# Patient Record
Sex: MALE | Race: ASIAN | NOT HISPANIC OR LATINO | URBAN - METROPOLITAN AREA
[De-identification: names, ages, dates, MRNs, and addresses within clinical notes are randomized per-mention and may not be internally consistent; named-entity substitution may affect disease eponyms.]

---

## 2017-11-22 ENCOUNTER — COMMUNICATION - HEALTHEAST (OUTPATIENT)
Dept: FAMILY MEDICINE | Facility: CLINIC | Age: 37
End: 2017-11-22

## 2017-12-04 ENCOUNTER — OFFICE VISIT - HEALTHEAST (OUTPATIENT)
Dept: INTERNAL MEDICINE | Facility: CLINIC | Age: 37
End: 2017-12-04

## 2017-12-04 ENCOUNTER — COMMUNICATION - HEALTHEAST (OUTPATIENT)
Dept: TELEHEALTH | Facility: CLINIC | Age: 37
End: 2017-12-04

## 2017-12-04 DIAGNOSIS — R21 SKIN RASH: ICD-10-CM

## 2017-12-04 DIAGNOSIS — N42.81 PROSTATE PAIN: ICD-10-CM

## 2017-12-04 DIAGNOSIS — Z00.00 ANNUAL PHYSICAL EXAM: ICD-10-CM

## 2017-12-04 DIAGNOSIS — E78.5 HYPERLIPIDEMIA: ICD-10-CM

## 2017-12-04 DIAGNOSIS — R79.89 ELEVATED LFTS: ICD-10-CM

## 2017-12-04 LAB
CHOLEST SERPL-MCNC: 249 MG/DL
FASTING STATUS PATIENT QL REPORTED: ABNORMAL
HDLC SERPL-MCNC: 59 MG/DL
LDLC SERPL CALC-MCNC: 179 MG/DL
TRIGL SERPL-MCNC: 55 MG/DL

## 2017-12-04 ASSESSMENT — MIFFLIN-ST. JEOR: SCORE: 1576.73

## 2017-12-05 ENCOUNTER — COMMUNICATION - HEALTHEAST (OUTPATIENT)
Dept: INTERNAL MEDICINE | Facility: CLINIC | Age: 37
End: 2017-12-05

## 2017-12-06 ENCOUNTER — RECORDS - HEALTHEAST (OUTPATIENT)
Dept: ADMINISTRATIVE | Facility: OTHER | Age: 37
End: 2017-12-06

## 2018-12-19 ENCOUNTER — OFFICE VISIT - HEALTHEAST (OUTPATIENT)
Dept: INTERNAL MEDICINE | Facility: CLINIC | Age: 38
End: 2018-12-19

## 2018-12-19 DIAGNOSIS — Z00.00 ANNUAL PHYSICAL EXAM: ICD-10-CM

## 2018-12-19 DIAGNOSIS — E78.5 HYPERLIPIDEMIA LDL GOAL <130: ICD-10-CM

## 2018-12-19 DIAGNOSIS — N50.89 TESTICULAR LUMP: ICD-10-CM

## 2018-12-19 LAB
ALBUMIN UR-MCNC: NEGATIVE MG/DL
APPEARANCE UR: CLEAR
BILIRUB UR QL STRIP: NEGATIVE
COLOR UR AUTO: YELLOW
ERYTHROCYTE [DISTWIDTH] IN BLOOD BY AUTOMATED COUNT: 11.2 % (ref 11–14.5)
GLUCOSE UR STRIP-MCNC: NEGATIVE MG/DL
HCT VFR BLD AUTO: 46.6 % (ref 40–54)
HGB BLD-MCNC: 15.8 G/DL (ref 14–18)
HGB UR QL STRIP: NEGATIVE
KETONES UR STRIP-MCNC: NEGATIVE MG/DL
LEUKOCYTE ESTERASE UR QL STRIP: NEGATIVE
MCH RBC QN AUTO: 29.7 PG (ref 27–34)
MCHC RBC AUTO-ENTMCNC: 34 G/DL (ref 32–36)
MCV RBC AUTO: 87 FL (ref 80–100)
NITRATE UR QL: NEGATIVE
PH UR STRIP: 6 [PH] (ref 5–8)
PLATELET # BLD AUTO: 170 THOU/UL (ref 140–440)
PMV BLD AUTO: 8.5 FL (ref 7–10)
RBC # BLD AUTO: 5.33 MILL/UL (ref 4.4–6.2)
SP GR UR STRIP: 1.02 (ref 1–1.03)
UROBILINOGEN UR STRIP-ACNC: NORMAL
WBC: 5.1 THOU/UL (ref 4–11)

## 2018-12-19 ASSESSMENT — MIFFLIN-ST. JEOR: SCORE: 1556.94

## 2018-12-20 ENCOUNTER — COMMUNICATION - HEALTHEAST (OUTPATIENT)
Dept: INTERNAL MEDICINE | Facility: CLINIC | Age: 38
End: 2018-12-20

## 2018-12-20 LAB
ALBUMIN SERPL-MCNC: 4.5 G/DL (ref 3.5–5)
ALP SERPL-CCNC: 62 U/L (ref 45–120)
ALT SERPL W P-5'-P-CCNC: 50 U/L (ref 0–45)
ANION GAP SERPL CALCULATED.3IONS-SCNC: 11 MMOL/L (ref 5–18)
AST SERPL W P-5'-P-CCNC: 26 U/L (ref 0–40)
BILIRUB SERPL-MCNC: 1.4 MG/DL (ref 0–1)
BUN SERPL-MCNC: 15 MG/DL (ref 8–22)
CALCIUM SERPL-MCNC: 9.8 MG/DL (ref 8.5–10.5)
CHLORIDE BLD-SCNC: 106 MMOL/L (ref 98–107)
CHOLEST SERPL-MCNC: 214 MG/DL
CO2 SERPL-SCNC: 25 MMOL/L (ref 22–31)
CREAT SERPL-MCNC: 0.82 MG/DL (ref 0.7–1.3)
FASTING STATUS PATIENT QL REPORTED: YES
GFR SERPL CREATININE-BSD FRML MDRD: >60 ML/MIN/1.73M2
GLUCOSE BLD-MCNC: 92 MG/DL (ref 70–125)
HDLC SERPL-MCNC: 58 MG/DL
LDLC SERPL CALC-MCNC: 142 MG/DL
POTASSIUM BLD-SCNC: 4 MMOL/L (ref 3.5–5)
PROT SERPL-MCNC: 7.8 G/DL (ref 6–8)
SODIUM SERPL-SCNC: 142 MMOL/L (ref 136–145)
TRIGL SERPL-MCNC: 70 MG/DL

## 2018-12-21 ENCOUNTER — COMMUNICATION - HEALTHEAST (OUTPATIENT)
Dept: INTERNAL MEDICINE | Facility: CLINIC | Age: 38
End: 2018-12-21

## 2019-05-09 ENCOUNTER — COMMUNICATION - HEALTHEAST (OUTPATIENT)
Dept: SCHEDULING | Facility: CLINIC | Age: 39
End: 2019-05-09

## 2019-05-10 ENCOUNTER — HOSPITAL ENCOUNTER (OUTPATIENT)
Dept: ULTRASOUND IMAGING | Facility: HOSPITAL | Age: 39
Discharge: HOME | End: 2019-05-10

## 2019-05-10 ENCOUNTER — OFFICE VISIT - HEALTHEAST (OUTPATIENT)
Dept: INTERNAL MEDICINE | Facility: CLINIC | Age: 39
End: 2019-05-10

## 2019-05-10 DIAGNOSIS — R10.11 RIGHT UPPER QUADRANT PAIN: ICD-10-CM

## 2019-05-10 LAB
ALBUMIN SERPL-MCNC: 4.5 G/DL (ref 3.5–5)
ALBUMIN UR-MCNC: NEGATIVE MG/DL
ALP SERPL-CCNC: 58 U/L (ref 45–120)
ALT SERPL W P-5'-P-CCNC: 45 U/L (ref 0–45)
ANION GAP SERPL CALCULATED.3IONS-SCNC: 8 MMOL/L (ref 5–18)
APPEARANCE UR: CLEAR
AST SERPL W P-5'-P-CCNC: 27 U/L (ref 0–40)
BASOPHILS # BLD AUTO: 0 THOU/UL (ref 0–0.2)
BASOPHILS NFR BLD AUTO: 1 % (ref 0–2)
BILIRUB SERPL-MCNC: 1.4 MG/DL (ref 0–1)
BILIRUB UR QL STRIP: NEGATIVE
BUN SERPL-MCNC: 15 MG/DL (ref 8–22)
CALCIUM SERPL-MCNC: 9.9 MG/DL (ref 8.5–10.5)
CHLORIDE BLD-SCNC: 107 MMOL/L (ref 98–107)
CO2 SERPL-SCNC: 28 MMOL/L (ref 22–31)
COLOR UR AUTO: YELLOW
CREAT SERPL-MCNC: 0.96 MG/DL (ref 0.7–1.3)
EOSINOPHIL # BLD AUTO: 0.1 THOU/UL (ref 0–0.4)
EOSINOPHIL NFR BLD AUTO: 1 % (ref 0–6)
ERYTHROCYTE [DISTWIDTH] IN BLOOD BY AUTOMATED COUNT: 11.4 % (ref 11–14.5)
GFR SERPL CREATININE-BSD FRML MDRD: >60 ML/MIN/1.73M2
GLUCOSE BLD-MCNC: 91 MG/DL (ref 70–125)
GLUCOSE UR STRIP-MCNC: NEGATIVE MG/DL
HCT VFR BLD AUTO: 48.8 % (ref 40–54)
HGB BLD-MCNC: 16.2 G/DL (ref 14–18)
HGB UR QL STRIP: NEGATIVE
KETONES UR STRIP-MCNC: NEGATIVE MG/DL
LEUKOCYTE ESTERASE UR QL STRIP: NEGATIVE
LIPASE SERPL-CCNC: 41 U/L (ref 0–52)
LYMPHOCYTES # BLD AUTO: 1.6 THOU/UL (ref 0.8–4.4)
LYMPHOCYTES NFR BLD AUTO: 33 % (ref 20–40)
MCH RBC QN AUTO: 29.8 PG (ref 27–34)
MCHC RBC AUTO-ENTMCNC: 33.3 G/DL (ref 32–36)
MCV RBC AUTO: 90 FL (ref 80–100)
MONOCYTES # BLD AUTO: 0.4 THOU/UL (ref 0–0.9)
MONOCYTES NFR BLD AUTO: 8 % (ref 2–10)
NEUTROPHILS # BLD AUTO: 2.8 THOU/UL (ref 2–7.7)
NEUTROPHILS NFR BLD AUTO: 58 % (ref 50–70)
NITRATE UR QL: NEGATIVE
PH UR STRIP: 6 [PH] (ref 5–8)
PLATELET # BLD AUTO: 176 THOU/UL (ref 140–440)
PMV BLD AUTO: 7.9 FL (ref 7–10)
POTASSIUM BLD-SCNC: 4.1 MMOL/L (ref 3.5–5)
PROT SERPL-MCNC: 7.6 G/DL (ref 6–8)
RBC # BLD AUTO: 5.44 MILL/UL (ref 4.4–6.2)
SODIUM SERPL-SCNC: 143 MMOL/L (ref 136–145)
SP GR UR STRIP: 1.02 (ref 1–1.03)
UROBILINOGEN UR STRIP-ACNC: NORMAL
WBC: 4.9 THOU/UL (ref 4–11)

## 2019-05-10 ASSESSMENT — MIFFLIN-ST. JEOR: SCORE: 1571.63

## 2019-05-13 ENCOUNTER — COMMUNICATION - HEALTHEAST (OUTPATIENT)
Dept: INTERNAL MEDICINE | Facility: CLINIC | Age: 39
End: 2019-05-13

## 2019-05-13 DIAGNOSIS — E80.6 HYPERBILIRUBINEMIA: ICD-10-CM

## 2019-07-02 ENCOUNTER — COMMUNICATION - HEALTHEAST (OUTPATIENT)
Dept: INTERNAL MEDICINE | Facility: CLINIC | Age: 39
End: 2019-07-02

## 2019-07-02 ENCOUNTER — RECORDS - HEALTHEAST (OUTPATIENT)
Dept: GENERAL RADIOLOGY | Facility: CLINIC | Age: 39
End: 2019-07-02

## 2019-07-02 DIAGNOSIS — R10.13 ABDOMINAL PAIN, EPIGASTRIC: ICD-10-CM

## 2019-07-02 DIAGNOSIS — R10.11 RIGHT UPPER QUADRANT PAIN: ICD-10-CM

## 2019-07-05 ENCOUNTER — AMBULATORY - HEALTHEAST (OUTPATIENT)
Dept: LAB | Facility: CLINIC | Age: 39
End: 2019-07-05

## 2019-07-05 DIAGNOSIS — R10.13 ABDOMINAL PAIN, EPIGASTRIC: ICD-10-CM

## 2019-07-08 ENCOUNTER — COMMUNICATION - HEALTHEAST (OUTPATIENT)
Dept: INTERNAL MEDICINE | Facility: CLINIC | Age: 39
End: 2019-07-08

## 2019-07-08 LAB
H PYLORI AG STL QL IA: NORMAL
REPORT STATUS: NORMAL
SPECIMEN DESCRIPTION: NORMAL

## 2019-07-09 ENCOUNTER — COMMUNICATION - HEALTHEAST (OUTPATIENT)
Dept: INTERNAL MEDICINE | Facility: CLINIC | Age: 39
End: 2019-07-09

## 2020-02-24 ENCOUNTER — COMMUNICATION - HEALTHEAST (OUTPATIENT)
Dept: SCHEDULING | Facility: CLINIC | Age: 40
End: 2020-02-24

## 2020-04-13 ENCOUNTER — COMMUNICATION - HEALTHEAST (OUTPATIENT)
Dept: SCHEDULING | Facility: CLINIC | Age: 40
End: 2020-04-13

## 2020-04-14 ENCOUNTER — COMMUNICATION - HEALTHEAST (OUTPATIENT)
Dept: CARDIOLOGY | Facility: CLINIC | Age: 40
End: 2020-04-14

## 2020-04-15 ENCOUNTER — HOSPITAL ENCOUNTER (OUTPATIENT)
Dept: RADIOLOGY | Facility: HOSPITAL | Age: 40
Discharge: HOME | End: 2020-04-15
Attending: INTERNAL MEDICINE

## 2020-04-15 ENCOUNTER — OFFICE VISIT - HEALTHEAST (OUTPATIENT)
Dept: CARDIOLOGY | Facility: CLINIC | Age: 40
End: 2020-04-15

## 2020-04-15 DIAGNOSIS — R07.89 CHEST DISCOMFORT: ICD-10-CM

## 2020-04-15 DIAGNOSIS — E78.5 HYPERLIPIDEMIA LDL GOAL <100: ICD-10-CM

## 2020-04-15 DIAGNOSIS — R07.89 ATYPICAL CHEST PAIN: ICD-10-CM

## 2020-04-15 DIAGNOSIS — R06.00 DYSPNEA: ICD-10-CM

## 2020-04-15 LAB
ALBUMIN SERPL-MCNC: 4.3 G/DL (ref 3.5–5)
ALP SERPL-CCNC: 60 U/L (ref 45–120)
ALT SERPL W P-5'-P-CCNC: 44 U/L (ref 0–45)
ANION GAP SERPL CALCULATED.3IONS-SCNC: 8 MMOL/L (ref 5–18)
AST SERPL W P-5'-P-CCNC: 25 U/L (ref 0–40)
BILIRUB SERPL-MCNC: 1.7 MG/DL (ref 0–1)
BUN SERPL-MCNC: 13 MG/DL (ref 8–22)
CALCIUM SERPL-MCNC: 9.4 MG/DL (ref 8.5–10.5)
CHLORIDE BLD-SCNC: 104 MMOL/L (ref 98–107)
CHOLEST SERPL-MCNC: 225 MG/DL
CO2 SERPL-SCNC: 29 MMOL/L (ref 22–31)
CREAT SERPL-MCNC: 1.09 MG/DL (ref 0.7–1.3)
D DIMER PPP FEU-MCNC: <0.27 FEU UG/ML
ERYTHROCYTE [DISTWIDTH] IN BLOOD BY AUTOMATED COUNT: 12.9 % (ref 11–14.5)
FASTING STATUS PATIENT QL REPORTED: YES
GFR SERPL CREATININE-BSD FRML MDRD: >60 ML/MIN/1.73M2
GLUCOSE BLD-MCNC: 101 MG/DL (ref 70–125)
HCT VFR BLD AUTO: 47.9 % (ref 40–54)
HDLC SERPL-MCNC: 53 MG/DL
HGB BLD-MCNC: 16 G/DL (ref 14–18)
LDLC SERPL CALC-MCNC: 160 MG/DL
MCH RBC QN AUTO: 29.3 PG (ref 27–34)
MCHC RBC AUTO-ENTMCNC: 33.4 G/DL (ref 32–36)
MCV RBC AUTO: 88 FL (ref 80–100)
PLATELET # BLD AUTO: 213 THOU/UL (ref 140–440)
PMV BLD AUTO: 11 FL (ref 8.5–12.5)
POTASSIUM BLD-SCNC: 4 MMOL/L (ref 3.5–5)
PROT SERPL-MCNC: 7.6 G/DL (ref 6–8)
RBC # BLD AUTO: 5.47 MILL/UL (ref 4.4–6.2)
SODIUM SERPL-SCNC: 141 MMOL/L (ref 136–145)
TRIGL SERPL-MCNC: 62 MG/DL
TROPONIN I SERPL-MCNC: <0.01 NG/ML (ref 0–0.29)
WBC: 4.5 THOU/UL (ref 4–11)

## 2020-04-15 ASSESSMENT — MIFFLIN-ST. JEOR: SCORE: 1562.84

## 2020-04-16 LAB
ATRIAL RATE - MUSE: 61 BPM
DIASTOLIC BLOOD PRESSURE - MUSE: NORMAL
INTERPRETATION ECG - MUSE: NORMAL
P AXIS - MUSE: 55 DEGREES
PR INTERVAL - MUSE: 166 MS
QRS DURATION - MUSE: 94 MS
QT - MUSE: 420 MS
QTC - MUSE: 422 MS
R AXIS - MUSE: 85 DEGREES
SYSTOLIC BLOOD PRESSURE - MUSE: NORMAL
T AXIS - MUSE: 33 DEGREES
VENTRICULAR RATE- MUSE: 61 BPM

## 2020-04-20 ENCOUNTER — COMMUNICATION - HEALTHEAST (OUTPATIENT)
Dept: INTERNAL MEDICINE | Facility: CLINIC | Age: 40
End: 2020-04-20

## 2020-04-21 ENCOUNTER — COMMUNICATION - HEALTHEAST (OUTPATIENT)
Dept: CARDIOLOGY | Facility: CLINIC | Age: 40
End: 2020-04-21

## 2020-04-22 ENCOUNTER — OFFICE VISIT - HEALTHEAST (OUTPATIENT)
Dept: INTERNAL MEDICINE | Facility: CLINIC | Age: 40
End: 2020-04-22

## 2020-04-22 DIAGNOSIS — R07.89 SENSATION OF CHEST TIGHTNESS: ICD-10-CM

## 2020-04-22 DIAGNOSIS — R20.2 PARESTHESIA: ICD-10-CM

## 2020-04-22 DIAGNOSIS — R10.12 LUQ ABDOMINAL PAIN: ICD-10-CM

## 2020-04-23 ENCOUNTER — COMMUNICATION - HEALTHEAST (OUTPATIENT)
Dept: INTERNAL MEDICINE | Facility: CLINIC | Age: 40
End: 2020-04-23

## 2020-04-23 ENCOUNTER — COMMUNICATION - HEALTHEAST (OUTPATIENT)
Dept: SCHEDULING | Facility: CLINIC | Age: 40
End: 2020-04-23

## 2020-04-23 ENCOUNTER — AMBULATORY - HEALTHEAST (OUTPATIENT)
Dept: LAB | Facility: CLINIC | Age: 40
End: 2020-04-23

## 2020-04-23 DIAGNOSIS — R10.12 LUQ ABDOMINAL PAIN: ICD-10-CM

## 2020-04-23 DIAGNOSIS — R20.2 PARESTHESIA: ICD-10-CM

## 2020-04-23 LAB
ERYTHROCYTE [SEDIMENTATION RATE] IN BLOOD BY WESTERGREN METHOD: 6 MM/HR (ref 0–15)
LIPASE SERPL-CCNC: 51 U/L (ref 0–52)
VIT B12 SERPL-MCNC: 656 PG/ML (ref 213–816)

## 2020-04-24 ENCOUNTER — HOSPITAL ENCOUNTER (OUTPATIENT)
Dept: CARDIOLOGY | Facility: HOSPITAL | Age: 40
Discharge: HOME | End: 2020-04-24
Attending: INTERNAL MEDICINE

## 2020-04-24 ENCOUNTER — COMMUNICATION - HEALTHEAST (OUTPATIENT)
Dept: INTERNAL MEDICINE | Facility: CLINIC | Age: 40
End: 2020-04-24

## 2020-04-24 DIAGNOSIS — R07.89 ATYPICAL CHEST PAIN: ICD-10-CM

## 2020-04-24 LAB
CV STRESS CURRENT BP HE: NORMAL
CV STRESS CURRENT HR HE: 102
CV STRESS CURRENT HR HE: 108
CV STRESS CURRENT HR HE: 109
CV STRESS CURRENT HR HE: 115
CV STRESS CURRENT HR HE: 125
CV STRESS CURRENT HR HE: 126
CV STRESS CURRENT HR HE: 141
CV STRESS CURRENT HR HE: 149
CV STRESS CURRENT HR HE: 152
CV STRESS CURRENT HR HE: 152
CV STRESS CURRENT HR HE: 157
CV STRESS CURRENT HR HE: 158
CV STRESS CURRENT HR HE: 160
CV STRESS CURRENT HR HE: 167
CV STRESS CURRENT HR HE: 177
CV STRESS CURRENT HR HE: 65
CV STRESS CURRENT HR HE: 76
CV STRESS CURRENT HR HE: 86
CV STRESS CURRENT HR HE: 87
CV STRESS CURRENT HR HE: 89
CV STRESS CURRENT HR HE: 90
CV STRESS CURRENT HR HE: 93
CV STRESS CURRENT HR HE: 93
CV STRESS CURRENT HR HE: 95
CV STRESS CURRENT HR HE: 96
CV STRESS CURRENT HR HE: 98
CV STRESS DEVIATION TIME HE: NORMAL
CV STRESS ECHO PERCENT HR HE: NORMAL
CV STRESS EXERCISE STAGE HE: NORMAL
CV STRESS FINAL RESTING BP HE: NORMAL
CV STRESS FINAL RESTING HR HE: 89
CV STRESS MAX HR HE: 177
CV STRESS MAX TREADMILL GRADE HE: 16
CV STRESS MAX TREADMILL SPEED HE: 4.2
CV STRESS PEAK DIA BP HE: NORMAL
CV STRESS PEAK SYS BP HE: NORMAL
CV STRESS PHASE HE: NORMAL
CV STRESS PROTOCOL HE: NORMAL
CV STRESS RESTING PT POSITION HE: NORMAL
CV STRESS RESTING PT POSITION HE: NORMAL
CV STRESS ST DEVIATION AMOUNT HE: NORMAL
CV STRESS ST DEVIATION ELEVATION HE: NORMAL
CV STRESS ST EVELATION AMOUNT HE: NORMAL
CV STRESS TEST TYPE HE: NORMAL
CV STRESS TOTAL STAGE TIME MIN 1 HE: NORMAL
ECHO EJECTION FRACTION ESTIMATED: 60 %
RATE PRESSURE PRODUCT: NORMAL
STRESS ECHO BASELINE DIASTOLIC HE: 83
STRESS ECHO BASELINE HR: 64
STRESS ECHO BASELINE SYSTOLIC BP: 119
STRESS ECHO CALCULATED PERCENT HR: 99 %
STRESS ECHO LAST STRESS DIASTOLIC BP: 74
STRESS ECHO LAST STRESS HR: 177
STRESS ECHO LAST STRESS SYSTOLIC BP: 152
STRESS ECHO POST ESTIMATED WORKLOAD: 10.9
STRESS ECHO POST EXERCISE DUR MIN: 9
STRESS ECHO POST EXERCISE DUR SEC: 29
STRESS ECHO TARGET HR: 179

## 2020-04-28 ENCOUNTER — COMMUNICATION - HEALTHEAST (OUTPATIENT)
Dept: INTERNAL MEDICINE | Facility: CLINIC | Age: 40
End: 2020-04-28

## 2020-04-28 ENCOUNTER — COMMUNICATION - HEALTHEAST (OUTPATIENT)
Dept: ADMINISTRATIVE | Facility: CLINIC | Age: 40
End: 2020-04-28

## 2020-04-29 ENCOUNTER — COMMUNICATION - HEALTHEAST (OUTPATIENT)
Dept: INTERNAL MEDICINE | Facility: CLINIC | Age: 40
End: 2020-04-29

## 2020-05-27 ENCOUNTER — COMMUNICATION - HEALTHEAST (OUTPATIENT)
Dept: INTERNAL MEDICINE | Facility: CLINIC | Age: 40
End: 2020-05-27

## 2020-05-27 DIAGNOSIS — K21.00 GASTROESOPHAGEAL REFLUX DISEASE WITH ESOPHAGITIS: ICD-10-CM

## 2020-07-16 ENCOUNTER — RECORDS - HEALTHEAST (OUTPATIENT)
Dept: ADMINISTRATIVE | Facility: OTHER | Age: 40
End: 2020-07-16

## 2020-10-07 ENCOUNTER — OFFICE VISIT - HEALTHEAST (OUTPATIENT)
Dept: INTERNAL MEDICINE | Facility: CLINIC | Age: 40
End: 2020-10-07

## 2020-10-07 DIAGNOSIS — E78.5 HYPERLIPIDEMIA LDL GOAL <130: ICD-10-CM

## 2020-10-07 DIAGNOSIS — R17 ELEVATED BILIRUBIN: ICD-10-CM

## 2020-10-07 DIAGNOSIS — R07.9 RIGHT-SIDED CHEST PAIN: ICD-10-CM

## 2020-10-07 DIAGNOSIS — F41.9 ANXIETY: ICD-10-CM

## 2020-10-07 LAB
ALBUMIN SERPL-MCNC: 4.8 G/DL (ref 3.5–5)
ALP SERPL-CCNC: 65 U/L (ref 45–120)
ALT SERPL W P-5'-P-CCNC: 36 U/L (ref 0–45)
ANION GAP SERPL CALCULATED.3IONS-SCNC: 15 MMOL/L (ref 5–18)
AST SERPL W P-5'-P-CCNC: 24 U/L (ref 0–40)
BASOPHILS # BLD AUTO: 0 THOU/UL (ref 0–0.2)
BASOPHILS NFR BLD AUTO: 1 % (ref 0–2)
BILIRUB SERPL-MCNC: 2.2 MG/DL (ref 0–1)
BUN SERPL-MCNC: 19 MG/DL (ref 8–22)
CALCIUM SERPL-MCNC: 9.6 MG/DL (ref 8.5–10.5)
CHLORIDE BLD-SCNC: 104 MMOL/L (ref 98–107)
CHOLEST SERPL-MCNC: 207 MG/DL
CO2 SERPL-SCNC: 22 MMOL/L (ref 22–31)
CREAT SERPL-MCNC: 1.01 MG/DL (ref 0.7–1.3)
D DIMER PPP FEU-MCNC: <=0.27 FEU UG/ML
EOSINOPHIL # BLD AUTO: 0.1 THOU/UL (ref 0–0.4)
EOSINOPHIL NFR BLD AUTO: 2 % (ref 0–6)
ERYTHROCYTE [DISTWIDTH] IN BLOOD BY AUTOMATED COUNT: 12 % (ref 11–14.5)
ERYTHROCYTE [SEDIMENTATION RATE] IN BLOOD BY WESTERGREN METHOD: 4 MM/HR (ref 0–15)
FASTING STATUS PATIENT QL REPORTED: YES
GFR SERPL CREATININE-BSD FRML MDRD: >60 ML/MIN/1.73M2
GLUCOSE BLD-MCNC: 90 MG/DL (ref 70–125)
HCT VFR BLD AUTO: 48.3 % (ref 40–54)
HDLC SERPL-MCNC: 57 MG/DL
HGB BLD-MCNC: 15.9 G/DL (ref 14–18)
LDLC SERPL CALC-MCNC: 138 MG/DL
LYMPHOCYTES # BLD AUTO: 1.3 THOU/UL (ref 0.8–4.4)
LYMPHOCYTES NFR BLD AUTO: 28 % (ref 20–40)
MCH RBC QN AUTO: 29.6 PG (ref 27–34)
MCHC RBC AUTO-ENTMCNC: 32.9 G/DL (ref 32–36)
MCV RBC AUTO: 90 FL (ref 80–100)
MONOCYTES # BLD AUTO: 0.2 THOU/UL (ref 0–0.9)
MONOCYTES NFR BLD AUTO: 5 % (ref 2–10)
NEUTROPHILS # BLD AUTO: 3 THOU/UL (ref 2–7.7)
NEUTROPHILS NFR BLD AUTO: 65 % (ref 50–70)
PLATELET # BLD AUTO: 184 THOU/UL (ref 140–440)
PMV BLD AUTO: 8.5 FL (ref 7–10)
POTASSIUM BLD-SCNC: 4.2 MMOL/L (ref 3.5–5)
PROT SERPL-MCNC: 7.8 G/DL (ref 6–8)
RBC # BLD AUTO: 5.37 MILL/UL (ref 4.4–6.2)
SODIUM SERPL-SCNC: 141 MMOL/L (ref 136–145)
TRIGL SERPL-MCNC: 59 MG/DL
WBC: 4.6 THOU/UL (ref 4–11)

## 2020-10-09 ENCOUNTER — COMMUNICATION - HEALTHEAST (OUTPATIENT)
Dept: INTERNAL MEDICINE | Facility: CLINIC | Age: 40
End: 2020-10-09

## 2020-10-09 LAB
BILIRUB DIRECT SERPL-MCNC: 0.5 MG/DL
BILIRUB INDIRECT SERPL-MCNC: 1.7 MG/DL (ref 0–1)
BILIRUB SERPL-MCNC: 2.2 MG/DL (ref 0–1)
GLIADIN IGA SER-ACNC: 1 U/ML
GLIADIN IGG SER-ACNC: 0.4 U/ML
IGA SERPL-MCNC: 165 MG/DL (ref 65–400)
TTG IGA SER-ACNC: 0.6 U/ML
TTG IGG SER-ACNC: 0.7 U/ML

## 2020-10-11 ENCOUNTER — COMMUNICATION - HEALTHEAST (OUTPATIENT)
Dept: INTERNAL MEDICINE | Facility: CLINIC | Age: 40
End: 2020-10-11

## 2020-10-12 ENCOUNTER — RECORDS - HEALTHEAST (OUTPATIENT)
Dept: ADMINISTRATIVE | Facility: OTHER | Age: 40
End: 2020-10-12

## 2021-01-08 ENCOUNTER — COMMUNICATION - HEALTHEAST (OUTPATIENT)
Dept: ADMINISTRATIVE | Facility: CLINIC | Age: 41
End: 2021-01-08

## 2021-01-11 ENCOUNTER — OFFICE VISIT - HEALTHEAST (OUTPATIENT)
Dept: INTERNAL MEDICINE | Facility: CLINIC | Age: 41
End: 2021-01-11

## 2021-01-11 DIAGNOSIS — H66.90 ACUTE OTITIS MEDIA, UNSPECIFIED OTITIS MEDIA TYPE: ICD-10-CM

## 2021-01-11 ASSESSMENT — MIFFLIN-ST. JEOR: SCORE: 1594.59

## 2021-01-21 ENCOUNTER — RECORDS - HEALTHEAST (OUTPATIENT)
Dept: ADMINISTRATIVE | Facility: OTHER | Age: 41
End: 2021-01-21

## 2021-01-27 ENCOUNTER — RECORDS - HEALTHEAST (OUTPATIENT)
Dept: ADMINISTRATIVE | Facility: OTHER | Age: 41
End: 2021-01-27

## 2021-01-29 ENCOUNTER — HOSPITAL ENCOUNTER (OUTPATIENT)
Dept: ULTRASOUND IMAGING | Facility: HOSPITAL | Age: 41
Discharge: HOME | End: 2021-01-29
Attending: OTOLARYNGOLOGY

## 2021-01-29 DIAGNOSIS — M54.2 CERVICALGIA: ICD-10-CM

## 2021-01-29 DIAGNOSIS — H92.02 OTALGIA OF LEFT EAR: ICD-10-CM

## 2021-03-11 ENCOUNTER — COMMUNICATION - HEALTHEAST (OUTPATIENT)
Dept: INTERNAL MEDICINE | Facility: CLINIC | Age: 41
End: 2021-03-11

## 2021-05-28 NOTE — TELEPHONE ENCOUNTER
Test Results  Who is calling?:  patient  Who ordered the test:  Dr. Wilson  Type of test: Lab and Other: U/S Abdomen  Date of test:  5/10/2019  Where was the test performed:  [facility] [place] and U/S @ [facility]  What are your questions/concerns?:  Requesting results and a copy of the results left @ the  for patient to   Okay to leave a detailed message?:  Yes

## 2021-05-28 NOTE — PATIENT INSTRUCTIONS - HE
Whole Foods, Plant-Based    Abundant vegetables.    Only whole grains.    2-4 fruits/day.    Avoid animal products such as dairy, eggs and meat. (instead, take a vitamin b12 supplement)    Avoid sugars, oils and other processed foods.    Documentary: [place] [employer] [place]     Web: Nutritionstudies.org, Nutritionfacts.org    Books: How Not to Die (Cem), The [place] Study ([place])    If acutely worsens go to ER

## 2021-05-28 NOTE — PROGRESS NOTES
"[facility] Clinic Note    Patient Name: Som Kohli  Patient Age: 39 y.o.  YOB: 1980  MRN: [id number]    Date of visit: 5/10/2019    Assessment/Plan:  No results found for this or any previous visit (from the past 24 hour(s)).  No medications were ordered this encounter      ICD-10-CM    1. Right upper quadrant pain R10.11 US Abdomen Limited     Lipase     Comprehensive Metabolic Panel     Urinalysis-UC if Indicated     HM1(CBC and Differential)     XR Chest 2 Views     US Abdomen Limited     HM1 (CBC with Diff)       Wide differential but seems most likely gallbladder related we will get an ultrasound of this and check some basic blood work as well as a chest x-ray.  PERC negative.  If acutely worsening go to the emergency room.      Counseled patient regarding treatments, treatment options, risks and benefits and diagnosis.  The patient was interactive, attentive, verbalized understanding, and we discussed plan.       Patient Active Problem List   Diagnosis     HBV (hepatitis B virus) infection     Social History     Social History Narrative    Patient is  and have children.  Currently he is unemployed but in the past used to work for a [employer] company.     Family History   Problem Relation Age of Onset     No Medical Problems Mother      No Medical Problems Sister      No Medical Problems Brother      No outpatient encounter medications on file as of 5/10/2019.     No facility-administered encounter medications on file as of 5/10/2019.        Chief Complaint:   Chief Complaint   Patient presents with     Rib Injury     right side, patient feels like something is stuck, pain radiates to the right side lower back       /74 (Patient Site: Left Arm, Patient Position: Sitting, Cuff Size: Adult Regular)   Pulse 66   Ht 5' 3\" (1.6 m)   Wt 171 lb 3 oz (77.7 kg)   SpO2 98%   BMI 30.32 kg/m    HPI:   Pain location:ruq  Intensity:not painful, feels like a ball inside  Duration:Monday  How did it " "start:?  Character:ache  Radiation:to right back  Constant or intermittent:constant  Worsening:y  Improving:n  Makes worse:nothing  Makes better:nothing  Worst at onset: no   Ever had pain like this before: remotely     Fever: no   Nausea/vomiting: no   Diarrhea/constipation: no   Hematochezia or hematemesis: no   Dysuria/frequency/gross hematuria: no urine dark yesterday  Flank or back pain: no   Scrotal/testicular pain or swelling: no   Cough/chest pain: no cough, pain is in area of lower right rib cage   No shob    History of renal calculus: no   History of peptic ulcer dz or dyspepsia: no   History of gallbladder or liver disease: no   History of inflammatory bowel disease or diverticulitis: no   History of abdominal surgery or hernia: no   History of pancreatitis or alcohol abuse: no   History of tobacco abuse, drug abuse or vascular disease: no   History of DVT/PE or MI: no   History of cancer or immunocompromise: no     New medications: no         ROS: Pertinent ros findings in hpi, all other systems negative.    Objective/Physical Exam:     /74 (Patient Site: Left Arm, Patient Position: Sitting, Cuff Size: Adult Regular)   Pulse 66   Ht 5' 3\" (1.6 m)   Wt 171 lb 3 oz (77.7 kg)   SpO2 98%   BMI 30.32 kg/m      Gen: NAD, conversant, appears age, well-kempt  Skin: warm, dry, no rash, pallor cyanosis  HENT: normocephalic atraumatic, MMM, no oral lesions, otorrhea, rhinorrhea. TM's normal bilaterally.  Eyes: non-icteric, extra-ocular movements intact, PERRL, conjunctivae not injected. Holding eyes open comfortably, no drainage.  CV: NRRR no m/r/g, no peripheral edema. no JVD.  Resp: CTAB no w/r/r, normal respiratory effort  GI: soft, non-tender, non-distended. No masses.  MSK: no muscle or joint swelling.  Neuro: no dysarthria or gross asymmetry  Psych: full affect, oriented x 3  Lymph: No significant cervical lymphadenopathy  Hematologic: No petechiae or purpura.    tender to " palpation:no  soft:Yes  distended:no  pain with percussion:  no  hernia palpated: no  hepato-splenomegaly:no  masses: no    Saldivar's positive: no    ecchymoses:no  flank tenderness: no  suprapubic tenderness:no  >3cm pulsating mass: no    Exam limited by body habitus: slight    nttp over rib cage             Rene Wilson MD

## 2021-05-28 NOTE — TELEPHONE ENCOUNTER
"  CC:  \"Dull\" sensation on the R side of chest - near the base of the ribs on the R side    > Spells of pain worsening over time over the past week     > \"Not chest pain\" - \"Dull feeling\"     > No shortness of breath  > No wheezing   > No sweating    > Non radiating     > No back pain   > No jaw pain   > No dizziness   > No lightheadness      A/P:   > Appt for next week - he would really like to only see Dr Garduno   > I did caution that if this changes in some way - chest pain, shortness of breath sweating or other sx, to go to ED      Hamzah Marti RN   Triage and Medication Refills      Reason for Disposition    Intermittent chest pains persist > 3 days    Protocols used: CHEST PAIN-A-OH      "

## 2021-05-28 NOTE — TELEPHONE ENCOUNTER
Lab looks good overall.  Bilirubin slight elevated but same as previous - I will order some blood tests that he can come in and have done at his convenience to evaluate.

## 2021-05-28 NOTE — TELEPHONE ENCOUNTER
Patient Returning Call  Reason for call:  Return call  Information relayed to patient:  Patient was informed of Dr. Wilson's message below on his US results. Patient was also informed his results have been printed and ready for him to .  Patient has additional questions:  Yes  If YES, what are your questions/concerns:  Patient need to know the results of the blood work from Friday as well. Please send this message to Dr. Wilson to look at the patient's blood work from 5/10/19. Please also make sure the US report from 5/10/19 was printed as well.  Okay to leave a detailed message?: Yes          Notes recorded by Rene Wilson MD on 5/10/2019 at 8:09 PM CDT  Called patient, left message simply saying that studies were normal, I recommended that he follow through with the study that was ordered and let myself or Dr. Garduno know if symptoms are persisting. Could consider CT scan.

## 2021-05-30 NOTE — TELEPHONE ENCOUNTER
I put order in for H. Pylori test.let pt know it is a stool test and he can  collection kit at the lab.    Mayda

## 2021-05-30 NOTE — TELEPHONE ENCOUNTER
Who is calling:  Patient   Reason for Call:  Patient is want a H pylor test done having same symptoms then last time and want this done.   Date of last appointment with primary care: 05/10/19  Okay to leave a detailed message: Yes

## 2021-05-31 VITALS — HEIGHT: 63 IN | WEIGHT: 174.06 LBS | BODY MASS INDEX: 30.84 KG/M2

## 2021-06-02 VITALS — HEIGHT: 63 IN | WEIGHT: 169.7 LBS | BODY MASS INDEX: 30.07 KG/M2

## 2021-06-03 VITALS — HEIGHT: 63 IN | WEIGHT: 171.19 LBS | BODY MASS INDEX: 30.33 KG/M2

## 2021-06-04 VITALS
DIASTOLIC BLOOD PRESSURE: 80 MMHG | HEART RATE: 72 BPM | RESPIRATION RATE: 16 BRPM | WEIGHT: 171 LBS | SYSTOLIC BLOOD PRESSURE: 118 MMHG | BODY MASS INDEX: 30.3 KG/M2 | HEIGHT: 63 IN

## 2021-06-05 VITALS
BODY MASS INDEX: 30.6 KG/M2 | SYSTOLIC BLOOD PRESSURE: 104 MMHG | HEART RATE: 72 BPM | WEIGHT: 170 LBS | DIASTOLIC BLOOD PRESSURE: 86 MMHG | OXYGEN SATURATION: 98 %

## 2021-06-05 VITALS
HEIGHT: 63 IN | OXYGEN SATURATION: 99 % | WEIGHT: 178 LBS | BODY MASS INDEX: 31.54 KG/M2 | DIASTOLIC BLOOD PRESSURE: 88 MMHG | SYSTOLIC BLOOD PRESSURE: 124 MMHG | HEART RATE: 80 BPM | TEMPERATURE: 97.1 F

## 2021-06-06 NOTE — TELEPHONE ENCOUNTER
Patient Returning Call  Reason for call:  Returning clinic call.  Information relayed to patient:  Patient was read the note entry by Dr Garduno.  Patient expressed understanding of information given.  Patient has additional questions:  No  If YES, what are your questions/concerns:  NA  Okay to leave a detailed message?: No call back needed

## 2021-06-06 NOTE — TELEPHONE ENCOUNTER
"  Call from pt       He is wondering if needs medical attention?   Possible food contamination issue       Bought squid from a market - cooked it and ate it yesterday and noted that he found something in it that he is not sure what it is.       Believes them to be \"paracites\" but he is not sure      Is asx  - no nausea - no vomiting - no belly pain - no fevers        He is wondering what to do next       A/P:   > I will message provider to see if they would suggest medical care (even if asx)      > Suggested contacting [facility] health dept (gave tele#) to report possible food safety issue for advice         Pt tele# [phone]        renata kerr rn        "

## 2021-06-06 NOTE — TELEPHONE ENCOUNTER
I would recommend that he should  take a picture of a parasite that he is concerned about for future reference.  If the squid was cooked fully and was not raw , it should have killed the parasites.  If he develops symptoms of diarrhea we can see him in the office..    Dr EARL

## 2021-06-07 NOTE — PATIENT INSTRUCTIONS - HE
Nice to meet you today.We are going to get some blood work and will call you with results.We are going to plan a stress echocardiogram and a chest xray.Please come to the Er if symptoms are getting worse.My nurse is Gale and her number is [phone]

## 2021-06-07 NOTE — TELEPHONE ENCOUNTER
----- Message from Alcon Menjivar sent at 4/21/2020  3:37 PM CDT -----  Regarding: [facility] PT / LAB RESULTS  General phone call:    Caller: Som Lucas    Primary cardiologist: [facility]     Detailed reason for call: Pt stated that he was told he would receive a phone call regarding his lab results. Please call pt back.     Best phone number: [phone]    Best time to contact: Anytime     Ok to leave a detailed message? Yes     Device? No     Additional Info:

## 2021-06-07 NOTE — TELEPHONE ENCOUNTER
New Appointment Needed  What is the reason for the visit:    Same Date/Next Day Appt Request  What is the reason for your visit?: Patient was seen by a cardiologist on 4/15/2020 & was directed to see his PCP. He would like an in clinic appointment to be seen. He stated he needs to know if he needs any other testing.    Provider Preference: PCP only  How soon do you need to be seen?: Asap  Waitlist offered?: No  Okay to leave a detailed message:  Yes

## 2021-06-07 NOTE — TELEPHONE ENCOUNTER
Forms Request  Name of form/paperwork: Other:  consent to service form for visit  Have you been seen for this request: Yes:  .4/22/20  Do we have the form: Patient has the service agreement.  He is asking if he needs to do anything with the copy.  It says gave verbal consent .  He does not want his information to be added to the research dept also.  Please call him.   When is form needed by: NA  How would you like the form returned: NA  Patient Notified form requests are processed in 3-5 business days: No    Okay to leave a detailed message? Yes

## 2021-06-07 NOTE — TELEPHONE ENCOUNTER
This patient really needs ER evaluation, no other site is appropriate.  Will possibly need CT chest, covid 19 testing, etc.  Dr. Blanche MD

## 2021-06-07 NOTE — TELEPHONE ENCOUNTER
Please see if pt wants to have telephone/video  visit done to f/u on chest pain and elevated lipids ( I know he was evaluated by cardiology last week).

## 2021-06-07 NOTE — TELEPHONE ENCOUNTER
"Patient called because he had a virtual visit yesterday with recommendations for future blood work. One of the tests recommended was sedimentation rate. Patient asking what exactly that means.    Patient informed the lab test looks for any inflammation in the body and assesses the body's inflammatory response. Patient's appt yesterday regarded numbness and tingling in face and arms. Upon reviewing the notes it stated that pt was not showing stroke symptoms. He has had recent tests in the past that were inconclusive. Recent negative EKG, negative stress echo, negative d-dimer. It looks to be unclear at this time why the patient has the numbness and tingling.    I told patient my best guess was that since the patient was negative for stroke and cardiac symptoms, it is possible the doctor recommended these blood tests to help further diagnose the reason for his numbness and tingling.     Patient asked again, \"but what does the test do.\" I re-iterated that it is a blood test that measures inflammatory response. Perhaps there is inflammation in the patient's vasculature that could cause the tingling/numbness symptoms.  Questions answered to the best of my ability using nursing judgment and information from the chart. Patient does not have any other questions at this time.    Syeda Odell RN    Reason for Disposition    Health Information question, no triage required and triager able to answer question    Answer Assessment - Initial Assessment Questions  1. REASON FOR CALL or QUESTION: \"What is your reason for calling today?\" or \"How can I best help you?\" or \"What question do you have that I can help answer?\"      Asking about sedimentation rate lab test.    Protocols used: INFORMATION ONLY CALL-A-AH      "

## 2021-06-07 NOTE — TELEPHONE ENCOUNTER
FYI - Status Update  Who is Calling: Patient  Update: The patient is coming in today for lab draw, he is calling to state that he does not want to have the TSH done at this time.  This writer reviewed chart notes, and encouraged the patient to reconsider this lab test.  The patient said that he would wait to see what the other tests showed first and then decide.  Okay to leave a detailed message?:  No return call needed

## 2021-06-07 NOTE — TELEPHONE ENCOUNTER
----- Message from Soo Jimenez sent at 4/14/2020  9:51 AM CDT -----  Regarding: RE: [facility] 4/15 Pt- In clinic or telephone?  I called the patient to change to a telephone visit. Patient states that he would prefer to come into the clinic. Is that okay with [facility]?  ----- Message -----  From: Hailey Becker, RN  Sent: 4/14/2020   9:24 AM CDT  To: Soo Jimenez  Subject: RE: [facility] 4/15 Pt- In clinic or telephone?         Let's do a phone visit and if an office visit is needed after phone it can be set up.    ----- Message -----  From: Soo Jimenez  Sent: 4/14/2020   9:19 AM CDT  To: Hailey Becker, RN, Breann Melvin  Subject: [facility] 4/15 Pt- In clinic or telephone?             Best Hart,     This patient was added on for a consult with Dr. Chopra tomorrow 4/15.    Does [facility] recommend this be an in-clinic visit, or changed to a telephone visit?    Thanks,   Acacia

## 2021-06-07 NOTE — TELEPHONE ENCOUNTER
Wellness Screening Tool  Symptom Screening:  Do you have one of the following new symptoms:    Fever or reported chills?  No    A new cough (started within the past 14 days)?  No    Shortness of breath (started within the past 14 days) ?  No     Nausea, vomiting, or diarrhea?  No    Within the past 3 weeks, have you been exposed to someone with a known positive illness below:    COVID-19 (known or suspected)?  No    Chicken pox?  No    Mealses?  No    Pertussis?  No    Patient notified of visitor restrictions: Yes  Patient's appointment status: Patient will be seen in clinic as scheduled on 4/15

## 2021-06-07 NOTE — TELEPHONE ENCOUNTER
Who is calling:  Patient  Reason for Call:  Patient is scheduled for labs today/per PCP just a fyi  Date of last appointment with primary care: NA  Okay to leave a detailed message: No

## 2021-06-07 NOTE — PROGRESS NOTES
ASSESSMENT:     1. Paresthesia  Patient complains about tingling and numbness in his face on both sides and sometimes in his arms.  Mild headaches.  Encouraged him that he is not having a stroke since symptoms are more diffuse.  If symptoms do not improve we can check thyroid and B12 levels.  - Thyroid Stimulating Hormone (TSH); Future  - Vitamin B12; Future  - Sedimentation Rate; Future    2. Sensation of chest tightness  This is new since the beginning of April.  Patient is very anxious about it.  Recent EKG and blood work were negative including troponin, d-dimer and a chest x-ray.  He is scheduled to have exercise stress echo later this week.  Currently her symptoms are not completely typical for heart disease since then not reliably reproduced every time he is physically active but it would be good to rule out heart disease.  If stress echo is normal since he does have some difficulty with swallowing and neck tightness and possible bolus sensation we could potentially do endoscopy later in the summer.  For now risk discussed to take omeprazole twice a day and will try Carafate as well.    Additionally symptoms could be due to somatization and anxiety.  We can further discuss that on follow-up.    His cholesterol was noted to be moderately elevated at 160.  Since he does not have high blood pressure and HDL is not low his ten-year risk for heart disease calculated at 2.3%.  With this risk I would recommend dietary changes unless his stress test is abnormal.    - sucralfate (CARAFATE) 100 mg/mL suspension; Take 10 mL (1 g total) by mouth 4 (four) times a day.  Dispense: 420 mL; Refill: 1    3. LUQ abdominal pain  Patient is very concerned about his pancreas.  He wants pancreatic enzymes checked.  He reports that the pain sometimes goes shooting straight through his chest and possibly under the rib cage.  Will check lipase but discussed that since food does not trigger see his symptoms pancreatic process is  "unlikely.  Echocardiogram should show if he has any widening in the order in that area.  - Lipase; Future    PLAN:  Patient will call after he completes stress test and if he still having ongoing symptoms we can proceed with ordering a endoscopy although discussed that most likely would be nonurgent test that he can do in the summer.  Alternatively his symptoms could be attributable to anxiety and we can talk more about it on follow-up.  I also thought maybe he has a prodrome with viral symptoms but he has no respiratory symptoms at all.  Recent CBC did not show any abnormality.  Will check ESR.    CHIEF COMPLAINT:  Chief Complaint   Patient presents with     Follow-up     patient did not want to disclose to me what he wanted to discuss       HISTORY OF PRESENT ILLNESS:  Som is a 40 y.o. male calling in to the clinic today is a concern off intermittent chest tightness which he has been experiencing since beginning of April.    She does have history of anxiety, increased BMI, hyperlipidemia.  I have not seen him for the last 2 years.    Patient reports that symptoms started in the beginning of April.  She started experiencing chest pressure on the left side which is sharp and sometimes radiates into his back.  Pain is on and off and usually lasts a few minutes only.  There are no known triggers: It happens with and without activity.  If he climbs steps it does not necessarily happen consistently.  Intake of food does not change his symptoms.  He thought that maybe this was related to his acid reflux and started taking omeprazole 20 mg daily for the last 7 days with no change in his symptoms.  However he does note that his swallowing feels \"weird\".  She denies any choking episodes.  No NSAIDs, alcohol or smoking.  He has been drinking soda but has stopped 2 weeks ago when symptoms first started.  He might have mild bolus sensation.    He was seen by cardiology in the office on April 13.  EKG at that time was normal.  " Troponin d-dimer and CBC were negative.  CMP showed slight bilirubin elevation but rest of liver function tests were normal.  LDL was moderately elevated at 160 and chest x-ray was negative.  He has had exercise stress echo ordered and currently scheduled for Friday.    He also has complaints of numbness in the face on both sides including his lips and arms on both sides on and off.  She did he has been having mild headaches.  He denies anosmia.  No cough or upper respiratory symptoms.  CBC a week ago did not show any evidence of infection.  Patient was concerned about possibly having a stroke.    Anxiety is listed on his medical problem list but he is not on any medication for it.  He denies any problems with sleep.  Potentially symptoms could be related to anxiety as well.    REVIEW OF SYSTEMS:     All other systems are negative.    PFSH:  Reviewed, he denies family history of premature heart disease    TOBACCO USE:  Social History     Tobacco Use   Smoking Status Never Smoker   Smokeless Tobacco Never Used       VITALS:  Stated Blood Pressure recent cardiology visit showed blood pressure 120/80, historically he has not had any elevated blood pressures    PHYSICAL EXAM:  (observations via phone)  Physical exam was limited by telephone encounter.    On the phone patient is in no acute distress, no shortness or respiratory distress noted.  He is a very anxious however.  He kept me on the phone for over 40 minutes.  He has multiple concerns.  Thought processes linear,      ADDITIONAL HISTORY SUMMARIZED (2): Yes including recent cardiology visit  CARE EVERYWHERE/ EXTRA INFORMATION (1): No  RADIOLOGY TESTS (1): Chest x-ray  LABS (1): Yes  CARDIOLOGY/MEDICINE TESTS (1): EKG  INDEPENDENT REVIEW (2 each): No    Total data points: 5    The visit lasted a total of 40 minutes   CA intake time  5 minutes    Telephone Consent was completed by  staff and confirmed by Klarissa OSORIO      I have reviewed and updated  "the patient's Past Medical History, Social History, Family History as appropriate.    MEDICATIONS: Reviewed and updated per CA and MD  No current outpatient medications on file.     No current facility-administered medications for this visit.            The patient has been notified of following:     \"This telephone visit will be conducted via a call between you and your physician/provider. We have found that certain health care needs can be provided without the need for a physical exam.  This service lets us provide the care you need with a short phone conversation.  If a prescription is necessary we can send it directly to your pharmacy.  If lab work is needed we can place an order for that and you can then stop by our lab to have the test done at a later time.    If during the course of the call the physician/provider feels a telephone visit is not appropriate, you will not be charged for this service.\"   "

## 2021-06-07 NOTE — TELEPHONE ENCOUNTER
Pt already received test results by phone, but would like copies mailed to him.  Note sent to office.  -emily

## 2021-06-07 NOTE — TELEPHONE ENCOUNTER
Test Results  Who is calling?:  Patient  Who ordered the test:  Anita Garduno MD  Type of test: Lab  Date of test:  4/23/20  Where was the test performed:  In clinic  What are your questions/concerns?:  Patient refused the thyroid lab draw.  He is asking about results form lab draw. The writer read the following to patient per Dr Garduno result letter dated 4/24/20 as follow: Pancreatic enzyme level is normal.  B12 level is normal.  Inflammatory marker (ESR) is not elevated, infection is unlikely.Please schedule stress echo test and let me know if symptoms persist.    Please call with questions or contact us using [place].  Patient did have echo and results from Dr Chopra are normal.  Dr Chopra asked patient to follow up with provider as he is still have chest pain. He states it was a seven out of 10 pain when it started. Now it is 4/10. Writer recommended he talk to triage about continuing chest pain.   Did not want to talk to triage. Patient will call back to schedule follow up with Dr Garduno.     Okay to leave a detailed message?:  No

## 2021-06-07 NOTE — TELEPHONE ENCOUNTER
RN Triage:    Spoke with 40 yr old Xa who c/o:    Constant tightness of chest x 2 days, as if someone is squeezing his chest.    Rates discomfort a 6-7 on a 0-10 pain scale.  Reports more pressure than pain.    Constant shortness of breath, even at rest.  Pt is able to speak in brief sentences.    Tightness around the neck/throat.  Patient is able to swallow okay.    Patient also reports pressure radiating from chest to the L arm and sometimes the R arm shoulder/arm pit area.    Occasional lightheadedness.    Denies fever or cough.    Patient declines ED as nervous about going where others with COVID-19 may be present.  Pt is questioning evaluation by a specialist.    PLAN:  Advised ED per protocol due to constant chest pressure and shortness of breath.  Pt declines and is requesting alternative place of evaluation.  Will consult with PCP or covering MD please advise.    Gerda Flores RN   [facility] RN Triage      Reason for Disposition    SEVERE or constant chest pain (Exception: mild central chest pain, present only when coughing)     Reports as pressure versus pain.    [facility] Specific Disposition - REQUIRED: [facility] Specific Patient Instructions  COVID 19 Nurse Triage Plan/Patient Instructions    Please be aware that novel coronavirus (COVID-19) may be circulating in the community. If you develop symptoms such as fever, cough, or SOB or if you have concerns about the presence of another infection including coronavirus (COVID-19), please contact your health care provider or visit [email or web address].       Patient to go to ED and follow protocol based instructions. Follow System Ambulatory Workflow for COVID 19.     Bring Your Own Device:  Please also bring your smart device(s) (smart phones, tablets, laptops) and their charging cables for your personal use and to communicate with your care team during your visit.    Thank you for limiting contact with others, wearing a simple mask to cover  your cough, practice good hand hygiene habits and accessing our virtual services where possible to limit the spread of this virus.    For more information about COVID19 and options for caring for yourself at home, please visit the CDC website at https://www.cdc.gov/coronavirus/2019-ncov/about/steps-when-sick.html  For more options for care at [facility], please visit our website at [email or web address]    For more information, please use the [place] Department of Health ([facility]) COVID-19 Hotlines (Interpreters available):   Health questions: Phone Number: [phone] or [phone] and Hours: 7 a.m. to 7 p.m.  Schools and  questions: Phone Number: [phone] or [phone] and Hours 7 a.m. to 7 p.m.    Protocols used: CORONAVIRUS (COVID-19) DIAGNOSED OR [facility] 3.30.20

## 2021-06-07 NOTE — TELEPHONE ENCOUNTER
Patient Returning Call  Reason for call:  Return call  Information relayed to patient:  Patient was informed of Anita Garduno MD's message below.  Patient has additional questions:  No  If YES, what are your questions/concerns:  n/a  Okay to leave a detailed message?: No call back needed

## 2021-06-07 NOTE — TELEPHONE ENCOUNTER
Please let pt know, since stress test was normal and chest pain severity is improving, I would like to observe for now.  He should continue Omeprazole 20 mg two times a day and carafate prn. Call in 2 weeks with updates. Let me know if pain is getting worse or any new symptoms develop.    Dr EARL

## 2021-06-08 NOTE — TELEPHONE ENCOUNTER
Who is calling:  Patient   Reason for Call:  Caller had questions about medication and symptoms. Caller didn't give much information beside saying that he just had additional questions and would like for Anita Garduno MD nurse to call him back.   Date of last appointment with primary care: =  Okay to leave a detailed message: Yes

## 2021-06-08 NOTE — TELEPHONE ENCOUNTER
ORDER WILL BE PLACED IN [facility] PORTAL - THEY WILL CONTACT PATIENT FOR APPT    Tetracycline Pregnancy And Lactation Text: This medication is Pregnancy Category D and not consider safe during pregnancy. It is also excreted in breast milk. Bactrim Pregnancy And Lactation Text: This medication is Pregnancy Category D and is known to cause fetal risk.  It is also excreted in breast milk. Topical Sulfur Applications Counseling: Topical Sulfur Counseling: Patient counseled that this medication may cause skin irritation or allergic reactions.  In the event of skin irritation, the patient was advised to reduce the amount of the drug applied or use it less frequently.   The patient verbalized understanding of the proper use and possible adverse effects of topical sulfur application.  All of the patient's questions and concerns were addressed. Benzoyl Peroxide Counseling: Patient counseled that medicine may cause skin irritation and bleach clothing.  In the event of skin irritation, the patient was advised to reduce the amount of the drug applied or use it less frequently.   The patient verbalized understanding of the proper use and possible adverse effects of benzoyl peroxide.  All of the patient's questions and concerns were addressed. Dapsone Pregnancy And Lactation Text: This medication is Pregnancy Category C and is not considered safe during pregnancy or breast feeding. Minocycline Counseling: Patient advised regarding possible photosensitivity and discoloration of the teeth, skin, lips, tongue and gums.  Patient instructed to avoid sunlight, if possible.  When exposed to sunlight, patients should wear protective clothing, sunglasses, and sunscreen.  The patient was instructed to call the office immediately if the following severe adverse effects occur:  hearing changes, easy bruising/bleeding, severe headache, or vision changes.  The patient verbalized understanding of the proper use and possible adverse effects of minocycline.  All of the patient's questions and concerns were addressed. Topical Sulfur Applications Pregnancy And Lactation Text: This medication is Pregnancy Category C and has an unknown safety profile during pregnancy. It is unknown if this topical medication is excreted in breast milk. Tazorac Counseling:  Patient advised that medication is irritating and drying.  Patient may need to apply sparingly and wash off after an hour before eventually leaving it on overnight.  The patient verbalized understanding of the proper use and possible adverse effects of tazorac.  All of the patient's questions and concerns were addressed. Isotretinoin Counseling: Patient should get monthly blood tests, not donate blood, not drive at night if vision affected, not share medication, and not undergo elective surgery for 6 months after tx completed. Side effects reviewed, pt to contact office should one occur. Spironolactone Counseling: Patient advised regarding risks of diarrhea, abdominal pain, hyperkalemia, birth defects (for female patients), liver toxicity and renal toxicity. The patient may need blood work to monitor liver and kidney function and potassium levels while on therapy. The patient verbalized understanding of the proper use and possible adverse effects of spironolactone.  All of the patient's questions and concerns were addressed. Birth Control Pills Counseling: Birth Control Pill Counseling: I discussed with the patient the potential side effects of OCPs including but not limited to increased risk of stroke, heart attack, thrombophlebitis, deep venous thrombosis, hepatic adenomas, breast changes, GI upset, headaches, and depression.  The patient verbalized understanding of the proper use and possible adverse effects of OCPs. All of the patient's questions and concerns were addressed. Isotretinoin Pregnancy And Lactation Text: This medication is Pregnancy Category X and is considered extremely dangerous during pregnancy. It is unknown if it is excreted in breast milk. Azithromycin Counseling:  I discussed with the patient the risks of azithromycin including but not limited to GI upset, allergic reaction, drug rash, diarrhea, and yeast infections. Doxycycline Pregnancy And Lactation Text: This medication is Pregnancy Category D and not consider safe during pregnancy. It is also excreted in breast milk but is considered safe for shorter treatment courses. Doxycycline Counseling:  Patient counseled regarding possible photosensitivity and increased risk for sunburn.  Patient instructed to avoid sunlight, if possible.  When exposed to sunlight, patients should wear protective clothing, sunglasses, and sunscreen.  The patient was instructed to call the office immediately if the following severe adverse effects occur:  hearing changes, easy bruising/bleeding, severe headache, or vision changes.  The patient verbalized understanding of the proper use and possible adverse effects of doxycycline.  All of the patient's questions and concerns were addressed. Benzoyl Peroxide Pregnancy And Lactation Text: This medication is Pregnancy Category C. It is unknown if benzoyl peroxide is excreted in breast milk. Include Pregnancy/Lactation Warning?: No Tazorac Pregnancy And Lactation Text: This medication is not safe during pregnancy. It is unknown if this medication is excreted in breast milk. Erythromycin Counseling:  I discussed with the patient the risks of erythromycin including but not limited to GI upset, allergic reaction, drug rash, diarrhea, increase in liver enzymes, and yeast infections. Birth Control Pills Pregnancy And Lactation Text: This medication should be avoided if pregnant and for the first 30 days post-partum. Topical Clindamycin Counseling: Patient counseled that this medication may cause skin irritation or allergic reactions.  In the event of skin irritation, the patient was advised to reduce the amount of the drug applied or use it less frequently.   The patient verbalized understanding of the proper use and possible adverse effects of clindamycin.  All of the patient's questions and concerns were addressed. Spironolactone Pregnancy And Lactation Text: This medication can cause feminization of the male fetus and should be avoided during pregnancy. The active metabolite is also found in breast milk. Azithromycin Pregnancy And Lactation Text: This medication is considered safe during pregnancy and is also secreted in breast milk. Detail Level: Zone High Dose Vitamin A Counseling: Side effects reviewed, pt to contact office should one occur. Bactrim Counseling:  I discussed with the patient the risks of sulfa antibiotics including but not limited to GI upset, allergic reaction, drug rash, diarrhea, dizziness, photosensitivity, and yeast infections.  Rarely, more serious reactions can occur including but not limited to aplastic anemia, agranulocytosis, methemoglobinemia, blood dyscrasias, liver or kidney failure, lung infiltrates or desquamative/blistering drug rashes. Patient Specific Counseling (Will Not Stick From Patient To Patient): - pt here with father for acne on face, mostly on forehead a little bit on cheeks and around mouth, acne mild-moderate \\n- discussed with father and pt treatment options of topicals and/or oral antibiotics, they elected to start with both topical and oral antibiotics \\n- start doryx 120mg QD x 3 months; side effects discussed \\n- start Epiduo Forte QHS \\n- discussed that any acne regimen can take 6-8 weeks to see improvement, discussed realistic expectations \\n- briefly discussed accutane, pt not candidate today but can discuss more in detailed at f/u if needed \\n- Discussed importance of SPF 30+/ sun protection \\nRTC 2 months Topical Clindamycin Pregnancy And Lactation Text: This medication is Pregnancy Category B and is considered safe during pregnancy. It is unknown if it is excreted in breast milk. Topical Retinoid counseling:  Patient advised to apply a pea-sized amount only at bedtime and wait 30 minutes after washing their face before applying.  If too drying, patient may add a non-comedogenic moisturizer. The patient verbalized understanding of the proper use and possible adverse effects of retinoids.  All of the patient's questions and concerns were addressed. High Dose Vitamin A Pregnancy And Lactation Text: High dose vitamin A therapy is contraindicated during pregnancy and breast feeding. Topical Retinoid Pregnancy And Lactation Text: This medication is Pregnancy Category C. It is unknown if this medication is excreted in breast milk. Erythromycin Pregnancy And Lactation Text: This medication is Pregnancy Category B and is considered safe during pregnancy. It is also excreted in breast milk. Dapsone Counseling: I discussed with the patient the risks of dapsone including but not limited to hemolytic anemia, agranulocytosis, rashes, methemoglobinemia, kidney failure, peripheral neuropathy, headaches, GI upset, and liver toxicity.  Patients who start dapsone require monitoring including baseline LFTs and weekly CBCs for the first month, then every month thereafter.  The patient verbalized understanding of the proper use and possible adverse effects of dapsone.  All of the patient's questions and concerns were addressed. Tetracycline Counseling: Patient counseled regarding possible photosensitivity and increased risk for sunburn.  Patient instructed to avoid sunlight, if possible.  When exposed to sunlight, patients should wear protective clothing, sunglasses, and sunscreen.  The patient was instructed to call the office immediately if the following severe adverse effects occur:  hearing changes, easy bruising/bleeding, severe headache, or vision changes.  The patient verbalized understanding of the proper use and possible adverse effects of tetracycline.  All of the patient's questions and concerns were addressed. Patient understands to avoid pregnancy while on therapy due to potential birth defects.

## 2021-06-08 NOTE — TELEPHONE ENCOUNTER
Pt refusing to do phone visit with pcp and wants to know if he needs to give the Sucralfate more time for his body to adjust to it     Pt states that he took 2 pills yesterday the 1st time he was fine the 2nd dose he states that he got stomach pain and is wondering if this is normal and if he should let his body adjust to this or D/c    Pt informed that pcp may want to schedule VV with him to discuss but did not want to unless she says to

## 2021-06-08 NOTE — TELEPHONE ENCOUNTER
Please help pt schedule GI appt, referral placed.    I also ordered EGD since he continues to have GERD, tightness, throat discomfort and burning and Omeprazole has not helped.

## 2021-06-08 NOTE — TELEPHONE ENCOUNTER
Medication Question or Clarification  Who is calling: patient   What medication are you calling about (include dose and sig)?: sucralfate (CARAFATE) 100 mg/mL suspension   Who prescribed the medication?: Anita Garduno MD    What is your question/concern?: patient wants to know how long should be taking this medication? Wants to speak with the provider regarding some questions he has  Requested Pharmacy: [facility]  Okay to leave a detailed message?: Yes

## 2021-06-12 NOTE — TELEPHONE ENCOUNTER
Test Results  Who is calling?:  The patient   Who ordered the test:  Anita Garduno MD   Type of test: Lab  Date of test:  10/7/20  Where was the test performed:  [facility]  What are your questions/concerns?:  The patient would like the results of the test.   Okay to leave a detailed message?:  Yes

## 2021-06-12 NOTE — PATIENT INSTRUCTIONS - HE
1. Not sure what is causing symptoms: endoscopy showed normal stomach last year, liver was normal on US, Chest XR was normal last year and stress test was normal-so not heart related.    2. Keep dairy, if certain foods make it worse. We will check for gluten sensitivity.

## 2021-06-12 NOTE — PROGRESS NOTES
[facility] Follow Up Note    Assessment/Plan:    1. Right-sided chest pain  This is a recurrent symptom from last year when we were unable to give him any diagnoses and symptoms resolved spontaneously after a big work-up.  Last year he CBC, CMP, d-dimer and ESR were normal.  Right upper quadrant ultrasound, endoscopy with biopsies and stress echo were nondiagnostic.  He had a normal chest x-ray.  He is very concerned that it could be something digestive.  Check blood work today.  He does have appointment scheduled with gastroenterology for Monday.  - HM1(CBC and Differential)  - Comprehensive Metabolic Panel  - D-dimer, Quantitative  - Sedimentation Rate  - Celiac(Gluten)Antibody Panel    2. Hyperlipidemia LDL goal <130  LDL has fluctuated between 170 and 140.  He increased fruits and vegetables in his diet.  He is fasting today.  - Lipid Cascade FASTING    3. Anxiety  Does have a lot of anxiety and perseveration.  In the future we might attempt a conversation about treating anxiety with medication.      Anita Garduno MD    Chief Complaint:  Chief Complaint   Patient presents with     Follow-up       History of Present Illness:  Som is a 40 y.o. male with history of anxiety, increased BMI, hyperlipidemia is currently here for follow-up.    He has had several visits with me last spring with persistent symptoms of chest tightness, discomfort and multiple concerns that he was very anxious about..  Left ear discomfort more in the center of the chest and currently more to the right of the chest.  He is CBC, CMP lipase d-dimer were normal last year.  She had normal ultrasound done and endoscopy and saw gastroenterologist.  He wants a cardiologist due to hyperlipidemia and had a normal stress echo.  At some point pain just resolved on its own in 1 day he woke up without it.  Currently she is here with recurrent symptoms.    He feels more of a dull ache on the right side of his chest.  Is been going on for  months and more recently became more severe to the point of 6-7 out of 10.  Sometimes he feels it more after he eats but he is not better after he fasts.  He can feel it when he is exercising but does not have to stop and exercise does not change it.  He has not had any problems with breathing, cough or shortness of breath.  Changing positions does not change in either.  He has not tried anything over-the-counter.  He has been having regular bowel movements and denies constipation but is concerned that his stool is lighter in color although it is not white.  Occasionally it is not formed.  He denies bloating or burping.  He does not take any supplements or drinks alcohol.  He denies any acid reflux.  He had decreased red meat consumption due to hyperlipidemia.  Intermittently he gets vertigo on and off in the morning.    Review of Systems:  A comprehensive review of systems was performed and was otherwise negative    PFSH:  Social History: Viewed  Social History     Tobacco Use   Smoking Status Never Smoker   Smokeless Tobacco Never Used     Social History     Social History Narrative    Patient is  and have children.  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       Past History: Reviewed  No current outpatient medications on file.     No current facility-administered medications for this visit.        Family History: Reviewed    Physical Exam:    Vitals:    10/07/20 1525   BP: 104/86   Patient Site: Left Arm   Patient Position: Sitting   Cuff Size: Adult Regular   Pulse: 72   SpO2: 98%   Weight: 170 lb (77.1 kg)     Wt Readings from Last 3 Encounters:   10/07/20 170 lb (77.1 kg)   04/15/20 171 lb (77.6 kg)   05/10/19 171 lb 3 oz (77.7 kg)     Body mass index is 30.6 kg/m .    Constitutional:  Reveals a pleasant but very anxious young male.  Vitals:  Per nursing notes.  HEENT:No cervical LAD, no thyromegaly,  conjunctiva is pink, no scleral icterus, TMs are visualized and  normal bl, oropharynx is clear, no exudates,   Cardiac:  Regular rate and rhythm,no murmurs, rubs, or gallops. Carotids without bruits. Legs without edema. Palpation of the radial pulse regular.  No tenderness to palpation of his sternum or the ribs.  Lungs: Clear to auscultation bl.  Respiratory effort normal.  Abdomen:positive BS, soft, nontender, nondistended.  No hepato-splenomagaly no tenderness to deep palpation in the upper abdomen.  Skin:   Without rash, bruise, or palpable lesions.  He did have a light line on his right toenail which he is concerned about.  Rheumatologic: Normal joints and nails of the hands.  Neurologic:  Cranial nerves II-XII intact.     Psychiatric: affect is very anxious, he might have mild OCD tendencies due to repeated questions, memory intact.     Data Review:    Analysis and Summary of Old Records (2): yes      Records Requested (1): no      Other History Summarized (from other people in the room) (2): no    Radiology Tests Summarized (XRAY/CT/MRI/DXA) (1): us    Labs Reviewed (1): yes    Medicine Tests Reviewed (EKG/ECHO/COLONOSCOPY/EGD) (1): stress,egd    Independent Review of EKG or X-RAY (2): no

## 2021-06-12 NOTE — TELEPHONE ENCOUNTER
Som came by the clinic today to  lab results from earlier this week to bring to his appointment with JUSTIN on Monday. I gave him the printout and then printed out just the instruction portion of Dr. Garduno's message to relay to patient. He would still like to receive a letter in the mail with lab results and Dr. Garduno's interpretation.

## 2021-06-12 NOTE — TELEPHONE ENCOUNTER
Spoke with pt and relayed pcp message.  PT understanding.  Pt states he sees Dr. Aleman at [facility].  Labs faxed.  Pt also requested to  a copy of labs at the .

## 2021-06-12 NOTE — TELEPHONE ENCOUNTER
(NIKOLAS, pt is very anxious and OCD, try to speak slowly or he will have lots of questions):    Please let him know, blood work shows       Improved cholesterol (still slightly above goal but better then before).  Clotting and inflammatory markers are normal.  Red cell count is normal.    Liver function tests are normal, except for mild bilirubin elevation. I did do additional test to fraction different bilirubins and he has elevated indirect or unconjugated bilirubin--its a benign condition called  Gilbert syndrome: decrease in enzyme that helps excrete bilirubin. It is benign genetic condition. He does not have liver problems because of it.    He does already have a telephone appointment scheduled with gastroenterologist next week.  We can fax [facility] lab results, please find out which doctor he will be seeing there.    He should save  ALL other questions about this to them next week.

## 2021-06-14 NOTE — PROGRESS NOTES
"  Office Visit   Som Kohli   40 y.o. male    Date of Visit: 1/11/2021    Chief Complaint   Patient presents with     Pain     Left ear pain for a couple days, no other symptoms        Assessment and Plan   1. Acute otitis media, unspecified otitis media type  He does have a little bit of redness in the tympanic membrane on the left.  We will go ahead and start antibiotics.  Discussed that he could take ibuprofen as well.  Improving or if symptoms worsen over the next couple weeks then he will come back in for reevaluation.  - amoxicillin (AMOXIL) 500 MG tablet; Take 1 tablet (500 mg total) by mouth 3 (three) times a day for 10 days.  Dispense: 30 tablet; Refill: 0         Return if symptoms worsen or fail to improve.     History of Present Illness   This 40 y.o. old male comes in due to about 4 days of left ear pain.  The pain seems to wax and wane.  He has not had any fevers, chills, sore throat or other new symptoms with that.  He has had otitis media in the past and this seems similar.    Review of Systems: As above, systems otherwise reviewed and negative.     Medications, Allergies and Problem List   Patient Active Problem List   Diagnosis     HBV (hepatitis B virus) infection     Current Outpatient Medications   Medication Sig Dispense Refill     amoxicillin (AMOXIL) 500 MG tablet Take 1 tablet (500 mg total) by mouth 3 (three) times a day for 10 days. 30 tablet 0     No current facility-administered medications for this visit.      No Known Allergies       Physical Exam     /88 (Patient Site: Left Arm, Patient Position: Sitting)   Pulse 80   Temp 97.1  F (36.2  C)   Ht 5' 2.5\" (1.588 m)   Wt 178 lb (80.7 kg)   SpO2 99%   BMI 32.04 kg/m      General: This is an alert male in no apparent distress.  HEENT: Left tympanic membrane show some mild redness on the membrane.  Right tympanic membrane is normal.  Neck: Supple with no lymphadenopathy.     Additional Information   Social History     Tobacco Use "     Smoking status: Never Smoker     Smokeless tobacco: Never Used   Substance Use Topics     Alcohol use: No     Drug use: No          Sharron Doss MD

## 2021-06-14 NOTE — TELEPHONE ENCOUNTER
Reason for call:  Patient reporting a symptom, declined triage    Symptom or request: left ear pain    Duration (how long have symptoms been present): 2 days    Have you been treated for this before? No    Additional comments: Pt schedule ofv with Dr. Rascon on Monday but is also requesting Dr. Garduno call him regarding possible left ear infection. Says he notice pain started 2 days ago. He has no other symptoms. He has ear infection before and was prescribe antibiotic. I advise him that provider probably can't prescribe antibiotic without in-clinic visit or virtual visit but he insist Dr. Garduno call him anyways.    Phone Number patient can be reached at:    Cell number on file:    Telephone Information:   Mobile [phone]       Best Time:  anytime    Can we leave a detailed message on this number: Yes    Call taken on 1/8/2021 at 1:43 PM by Shelly Pena

## 2021-06-14 NOTE — PROGRESS NOTES
[facility] Follow Up Note    Assessment/Plan:    1. Annual physical exam  Patient refused flu shot or tetanus booster today.  She will come back for fasting cholesterol levels and sugar check.    2. Prostate pain and testicular pain associated with fluctuating dysuria  Patient denies any new sexual partners recently.  On exam today there is no evidence of prostatitis.  Patient does have discomfort in the perineal area and radiation into the left testicle.  No masses appreciated today.  Potentially he could have epididymitis.  We will check his urine test and I recommended that he takes ibuprofen as needed.  If symptoms do not improve he will see a urologist.  - Urinalysis-UC if Indicated  - HM1(CBC and Differential)  - HM1 (CBC with Diff)    3. Hyperlipidemia  - Lipid Cascade  - Comprehensive Metabolic Panel    4. Elevated LFTs  We will repeat liver function tests today.  Patient does have higher BMI and I suspect he might have fatty liver.  Also discussed testing for hepatitis but patient refused today.  Of note in the past it was ordered 2 years ago but for some reason was never done.  - Comprehensive Metabolic Panel    5. Skin rash  Unclear etiology, could be milia.  I recommended that he sees a dermatologist.  - Ambulatory referral to Dermatology    Anita Garduno MD    Chief Complaint:  Chief Complaint   Patient presents with     [facility]     Annual Exam       History of Present Illness:  Xa is a 37 y.o. male is history of hyperlipidemia, mild anxiety and slightly increased BMI who is currently here to meet me for the first time and have physical exam done.  He had some concerns.    Patient is asking about prostate cancer.  She does not have any family history of prostate cancer but has been having some discomfort in the prostate area for the last month.  He also had discomfort in the left testicle on and off.  He denies any testicular masses on self-exam.  She also has urinary burning  "on and off but not consistently.  He is sexually active with his wife.  She has not had any history of prostate infection or inflammation in the past.  Prostate exam today was nontender.  Discussed that he might have epididymitis.  I recommended that we check urinalysis.  Discussed ibuprofen as needed.  If symptoms do not improve he has appointment scheduled with the urologist already.  In 2015 on his physical exam she also complained about testicular pain and testicular ultrasound was ordered by the provider but I do not see any results.    She also has had a rash on his chest for a while now.  It never goes away but sometimes spreads.  Papules are flesh covered and not itchy and nontender.  I am not sure if it is mainly I.  I recommended that he sees a dermatologist.    She was also noted to have mildly elevated liver function test and last blood work.  Patient denies any alcohol use.  She wanted to have repeat liver function testing done today.  We discussed that I recommended hepatitis screening but patient refused at the moment.  It appears that hepatitis screening was ordered on his last physical 2 years ago but for some reason was never done.  If his liver function tests continue to be slightly elevated then he will need hepatitis screening and ultrasound.    Review of Systems:  A comprehensive review of systems was performed and was otherwise negative    PFSH:  Social History: Reviewed  History   Smoking Status     Never Smoker   Smokeless Tobacco     Never Used     Social History     Social History Narrative       Past History: Reviewed  No current outpatient prescriptions on file.     No current facility-administered medications for this visit.        Family History: Reviewed    Physical Exam:    Vitals:    12/04/17 1259   BP: 110/74   Patient Site: Left Arm   Patient Position: Sitting   Cuff Size: Adult Regular   Pulse: 78   Resp: 16   Weight: 174 lb 1 oz (79 kg)   Height: 5' 2.5\" (1.588 m)     Wt " Readings from Last 3 Encounters:   12/04/17 174 lb 1 oz (79 kg)   09/14/15 155 lb 9.6 oz (70.6 kg)     Body mass index is 31.33 kg/(m^2).    Constitutional:  Reveals a pleasant male.  Vitals:  Per nursing notes.  HEENT:No cervical LAD, no thyromegaly,  conjunctiva is pink, no scleral icterus, TMs are visualized and normal bl, oropharynx is clear, no exudates,   Cardiac:  Regular rate and rhythm,no murmurs, rubs, or gallops.  Legs without edema. Palpation of the radial pulse regular.  Lungs: Clear to auscultation bl.  Respiratory effort normal.  Abdomen:positive BS, soft, nontender, nondistended.  No hepato-splenomagaly  Skin: Has multiple flesh-colored papules on his chest which did not look inflamed, he has a larger papule in the center.  Rheumatologic: Normal joints and nails of the hands.  Neurologic:  Cranial nerves II-XII intact.     Psychiatric: affect appropriate, memory intact.  Mild anxiety noted.  Prostate: smooth and non tender on exam. Testicular exam normal, no masses.    Data Review:    Analysis and Summary of Old Records (2): Yes    Records Requested (1): No    Other History Summarized (from other people in the room) (2): No    Radiology Tests Summarized (XRAY/CT/MRI/DXA) (1): *No    Labs Reviewed (1): Yes    Medicine Tests Reviewed (EKG/ECHO/COLONOSCOPY/EGD) (1): No    Independent Review of EKG or X-RAY (2): No

## 2021-06-15 NOTE — TELEPHONE ENCOUNTER
It would be unusual to have such frequent ear infections. I would recommend that he schedules an office appointment

## 2021-06-15 NOTE — TELEPHONE ENCOUNTER
Spoke to patient. Patient requesting medication.    Dr. Doss diagnosed patient on 1/11/21 with otitis media of the left ear. She treated him with amoxicillin 500 mg, three times a day for 10 days.   He is now having same symptom/pain in the right ear that he had in the left ear.  Symptom for 3 days.  No fever.      He is asking if Dr Garduno would treat him over the phone with Amoxicillin.   His pharmacy is [facility] in [facility].

## 2021-06-16 PROBLEM — B19.10 HBV (HEPATITIS B VIRUS) INFECTION: Status: ACTIVE | Noted: 2018-12-19

## 2021-06-19 NOTE — LETTER
Letter by Anita Garduno MD at      Author: Anita Garduno MD Service: -- Author Type: --    Filed:  Encounter Date: 7/8/2019 Status: (Other)         Som Kohli  [address] [place] [address]             July 8, 2019         Dear Mr. Kohli,    Below are the results from your recent visit:    H. Pylori test is negative.    Resulted Orders   H. pylori Antigen, Stool   Result Value Ref Range    Specimen Description Feces     H pylori Antigen SEE NOTES       Comment:      H pylori Antigen         Negative for Helicobacter pylori antigen                            by enzyme immunoassay. A negative                           result indicates the absence of H.                            pylori antigen or that the level of antigen                           is below the level of detection.    Report Status FINAL 07/08/2019       Comment:      Testing lab location: [facility], [place], [address] [place] [address], [place] [id number],[facility] [id number] [phone]  Testing lab location: [facility], [address] [place] [address], [place] [id number],[facility] [id number] [phone]    Performed and/or entered by:  [place] [id number]   Performed and/or entered by:  [facility]  [address] [place] [address]           Please call with questions or contact us using [employer].    Sincerely,        Electronically signed by Anita Garduno MD

## 2021-06-20 NOTE — LETTER
Letter by Anita Garduno MD at      Author: Anita Garduno MD Service: -- Author Type: --    Filed:  Encounter Date: 4/24/2020 Status: (Other)         Som Kohli  [address] [place] [address]             April 24, 2020         Dear Mr. Kohli,    Below are the results from your recent visit:    Pancreatic enzyme level is normal.  B12 level is normal.  Inflammatory marker (ESR) is not elevated, infection is unlikely.    Resulted Orders   Vitamin B12   Result Value Ref Range    Vitamin B-12 656 213 - 816 pg/mL   Sedimentation Rate   Result Value Ref Range    Sed Rate 6 0 - 15 mm/hr   Lipase   Result Value Ref Range    Lipase 51 0 - 52 U/L       Please schedule stress echo test and let me know if symptoms persist.    Please call with questions or contact us using [employer].    Sincerely,        Electronically signed by Anita Garduno MD

## 2021-06-20 NOTE — LETTER
Letter by Anita Garduno MD at      Author: Anita Garduno MD Service: -- Author Type: --    Filed:  Encounter Date: 10/9/2020 Status: (Other)       Message from Dr. Garduno regarding test results from 10/7:     Improved cholesterol (still slightly above goal but better then before).  Clotting and inflammatory markers are normal.  Red cell count is normal.     Liver function tests are normal, except for mild bilirubin elevation. I did do additional test to fraction different bilirubins and he has elevated indirect or unconjugated bilirubin--its a benign condition called  Gilbert syndrome: decrease in enzyme that helps excrete bilirubin. It is benign genetic condition. He does not have liver problems because of it.     He does already have a telephone appointment scheduled with gastroenterologist next week.  We can fax [facility] lab results, please find out which doctor he will be seeing there.     He should save  ALL other questions about this to them next week.

## 2021-06-20 NOTE — LETTER
Letter by Benny Merritt NP at      Author: Benny Merritt NP Service: -- Author Type: --    Filed:  Encounter Date: 4/28/2020 Status: (Other)         Som Kohli  [address] [place] [address]      April 28, 2020      Dear Som,    This letter is to remind you that you will be due for your follow up appointment with Benny Merritt NP in April, 2020 . To help ensure you are in the best health possible, a regular follow-up with your cardiologist is essential.     Please call our Patient Scheduling Line at [phone] to schedule your appointment at your earliest convenience.  If you have recently scheduled an appointment, please disregard this letter.    We look forward to seeing you again. As always, we are available at the number  above for any questions or concerns you may have.      Sincerely,     The Physicians and Staff of [facility]

## 2021-06-20 NOTE — LETTER
Letter by Anita Garduno MD at      Author: Anita Garduno MD Service: -- Author Type: --    Filed:  Encounter Date: 10/11/2020 Status: (Other)         Som Kohli  [address] [place] [address]             October 11, 2020         Dear Mr. Kohli,    Below are the results from your recent visit:    Blood work shows Improved cholesterol (still slightly above goal but better then before).  Clotting and inflammatory markers are normal.  Red cell count is normal.     Liver function tests are normal, except for mild bilirubin elevation. I did do additional test to fraction different bilirubins and you do have elevated indirect or unconjugated bilirubin--its a benign condition called  Gilbert syndrome: decrease in enzyme that helps excrete bilirubin. It is benign genetic condition. You do not have liver disease because of it.    Resulted Orders   Comprehensive Metabolic Panel   Result Value Ref Range    Sodium 141 136 - 145 mmol/L    Potassium 4.2 3.5 - 5.0 mmol/L    Chloride 104 98 - 107 mmol/L    CO2 22 22 - 31 mmol/L    Anion Gap, Calculation 15 5 - 18 mmol/L    Glucose 90 70 - 125 mg/dL    BUN 19 8 - 22 mg/dL    Creatinine 1.01 0.70 - 1.30 mg/dL    GFR MDRD Af Amer >60 >60 mL/min/1.73m2    GFR MDRD Non Af Amer >60 >60 mL/min/1.73m2    Bilirubin, Total 2.2 (H) 0.0 - 1.0 mg/dL    Calcium 9.6 8.5 - 10.5 mg/dL    Protein, Total 7.8 6.0 - 8.0 g/dL    Albumin 4.8 3.5 - 5.0 g/dL    Alkaline Phosphatase 65 45 - 120 U/L    AST 24 0 - 40 U/L    ALT 36 0 - 45 U/L    Narrative    Fasting Glucose reference range is 70-99 mg/dL per  American Diabetes Association (ADA) guidelines.   D-dimer, Quantitative   Result Value Ref Range    D-Dimer, Quant <=0.27 <=0.50 FEU ug/mL    Narrative    0.50 ug/mL(FEU) = cutoff value for exclusion of DVT/PE   Sedimentation Rate   Result Value Ref Range    Sed Rate 4 0 - 15 mm/hr   HM1 (CBC with Diff)   Result Value Ref Range    WBC 4.6 4.0 - 11.0 thou/uL    RBC 5.37 4.40 - 6.20  mill/uL    Hemoglobin 15.9 14.0 - 18.0 g/dL    Hematocrit 48.3 40.0 - 54.0 %    MCV 90 80 - 100 fL    MCH 29.6 27.0 - 34.0 pg    MCHC 32.9 32.0 - 36.0 g/dL    RDW 12.0 11.0 - 14.5 %    Platelets 184 140 - 440 thou/uL    MPV 8.5 7.0 - 10.0 fL    Neutrophils % 65 50 - 70 %    Lymphocytes % 28 20 - 40 %    Monocytes % 5 2 - 10 %    Eosinophils % 2 0 - 6 %    Basophils % 1 0 - 2 %    Neutrophils Absolute 3.0 2.0 - 7.7 thou/uL    Lymphocytes Absolute 1.3 0.8 - 4.4 thou/uL    Monocytes Absolute 0.2 0.0 - 0.9 thou/uL    Eosinophils Absolute 0.1 0.0 - 0.4 thou/uL    Basophils Absolute 0.0 0.0 - 0.2 thou/uL   Celiac(Gluten)Antibody Panel   Result Value Ref Range    Gliadin IgA 1.0 0.0-<7.0 U/mL    Gliadin IgG 0.4 0.0-<7.0 U/mL    Tissue Transglutaminase IgG AB 0.7 0.0-<7.0 U/mL    Tissue Transglutaminase IgA AB 0.6 0.0-<7.0 U/mL    Immunoglobulin A 165 65 - 400 mg/dL    Narrative      < 7 U/mL = Negative    7-10 U/mL = Equivocal    > 10 U/mL = Positive    Positive results for the tTG and/or gliadin antibodies indicate possible celiac disease and a small intestinal biopsy my be indicated. Antibody levels decrease in patients on gluten-free diets; therefore, negative results do not exclude celiac disease. Total serum IgA is measured to identify selective IgA deficiency, which is present in up to 10% of celiac disease patients. Such patients would have negative results on IgA assays, but may have positive results on IgG antibody assays.   Lipid Cascade FASTING   Result Value Ref Range    Cholesterol 207 (H) <=199 mg/dL    Triglycerides 59 <=149 mg/dL    HDL Cholesterol 57 >=40 mg/dL    LDL Calculated 138 (H) <=129 mg/dL    Patient Fasting > 8hrs? Yes    Bilirubin, Panel   Result Value Ref Range    Bilirubin, Direct 0.5 <=0.5 mg/dL    Bilirubin, Indirect 1.7 (H) 0.0 - 1.0 mg/dL    Bilirubin, Total 2.2 (H) 0.0 - 1.0 mg/dL         Please call with questions or contact us using [email or web address].    Sincerely,        Electronically signed by  Anita Garduno MD

## 2021-06-22 NOTE — PROGRESS NOTES
[facility] Follow Up Note    Assessment/Plan:    1. Annual physical exam  Patient will have fasting blood work done today.  Recommended he continues to exercise and weight loss.  He refused flu shot today.  He will check when his last tetanus shot is before getting a booster.    2. Testicular lump  Patient states that the lump has gotten bigger.  On exam it is really hard to appreciate.  Previously he did have mild abnormality on ultrasound done in 2014.  In the interim he saw urologist who treated him for possible chronic prostatitis but did not feel that there was any testicular abnormality to worry about.  To address patient concerns we will repeat an ultrasound since it did see something there in the area of concern.  Patient was reassured.  - HM2(CBC w/o Differential)  - Urinalysis-UC if Indicated  - US Scrotum and Testicles W Duplex Ltd; Future  - Ambulatory referral to Urology    3. Hyperlipidemia LDL goal <130  - Lipid Cascade  - Comprehensive Metabolic Panel    Anita Garduno MD    Chief Complaint:  Chief Complaint   Patient presents with     Annual Exam       History of Present Illness:  Som is a 38 y.o. male  hyperlipidemia, mild anxiety and slightly increased BMI who is currently here for a physical.    She does have moderately elevated LDL on last blood work at 170.  Since I last saw him he decreased the amount of red meat consumption, lost 5 pounds and currently runs couple of times a week.  His BMI still above goal.  We will have fasting cholesterol and sugars done today.    She also is concerned about testicular lump on the left side.  He feels it is slightly enlarged in size.  Previously he saw urologist for that who said that it was nothing.  At that time he was having mild dysuria and prostate discomfort and was put on Bactrim for prostatitis.  Currently he occasionally has dysuria once every 3-4 months which self resolves.  Is sexually active with his wife.  Denies any sexual  "dysfunction.  No family history of testicular cancer.  He had testicular ultrasound done in 2014 at [facility] which showed small bilateral hydroceles and small homogeneous lesion on the left testes which was 0.3 mm and was benign in appearance.    Review of Systems:  A comprehensive review of systems was performed and was otherwise negative, no shortness of breath or chest pains.  Bowels are normal    PFSH:  Social History: Reviewed  Social History     Tobacco Use   Smoking Status Never Smoker   Smokeless Tobacco Never Used     Social History     Social History Narrative    Patient is  and have children.  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       Past History: Reviewed  No current outpatient medications on file.     No current facility-administered medications for this visit.        Family History: Reviewed    Physical Exam:    Vitals:    12/19/18 1607   BP: 114/76   Patient Site: Left Arm   Patient Position: Sitting   Cuff Size: Adult Regular   Pulse: 60   SpO2: 99%   Weight: 169 lb 11.2 oz (77 kg)   Height: 5' 2.5\" (1.588 m)     Wt Readings from Last 3 Encounters:   12/19/18 169 lb 11.2 oz (77 kg)   12/04/17 174 lb 1 oz (79 kg)   09/14/15 155 lb 9.6 oz (70.6 kg)     Body mass index is 30.54 kg/m .    Constitutional:  Reveals a pleasant male.  Vitals:  Per nursing notes.  HEENT:No cervical LAD, no thyromegaly,  conjunctiva is pink, no scleral icterus, TMs are visualized and normal bl, oropharynx is clear, no exudates,   Cardiac:  Regular rate and rhythm,no murmurs, rubs, or gallops.Legs without edema. Palpation of the radial pulse regular.  Lungs: Clear to auscultation bl.  Respiratory effort normal.  Abdomen:positive BS, soft, nontender, nondistended.  No hepato-splenomagaly  Skin:   Without rash, bruise, or palpable lesions.  Rheumatologic: Normal joints and nails of the hands.  Neurologic:  Cranial nerves II-XII intact.     Psychiatric: affect appropriate, memory " intact.   Testicular exam: normal exam, could not feel a lump on the left.    Data Review:    Analysis and Summary of Old Records (2): This    Records Requested (1): No    Other History Summarized (from other people in the room) (2): No    Radiology Tests Summarized (XRAY/CT/MRI/DXA) (1): Yes testicular ultrasound    Labs Reviewed (1): Yes    Medicine Tests Reviewed (EKG/ECHO/COLONOSCOPY/EGD) (1): No    Independent Review of EKG or X-RAY (2): No

## 2021-06-29 NOTE — PROGRESS NOTES
Progress Notes by Brad Chopra MD at 4/15/2020  9:10 AM     Author: Brad Chopra MD Service: -- Author Type: Physician    Filed: 4/15/2020 10:10 AM Encounter Date: 4/15/2020 Status: Signed    : Brad Chopra MD (Physician)             [facility] Heart Care Office Consult       Assessment:   1.  Chest discomfort.  Patient describes intermittent chest pressure with some radiation to his left arm and some associated shortness of breath, nausea and sensation of fullness.  Initial symptoms were 2 to 3 weeks ago and then improved spontaneously but has reoccurred within the past few days.  Cardiac risk factors include hyperlipidemia.  We talked about the symptoms at length and recommended that additional investigation is warranted.  He is reluctant to be evaluated in the emergency room.  We talked about his EKG being normal.  We talked about being evaluated in the emergency room which is the most prompt way to evaluate but he again was reluctant.  In light of his EKG being normal I have recommended laboratory studies including a troponin, d-dimer, chemistries and CBC.  We will plan for an exercise stress echocardiogram today at [facility] as well as a PA and lateral chest x-ray and will also correspond with his primary care provider.  I have asked him to seek more immediate attention if symptoms are getting worse but will be in touch with our plan to test for today.  1. Chest discomfort  ECG Clinic - Today      Plan:   1.  Stress echocardiogram  2.  Laboratory studies as outlined.  3.  Chest x-ray  4.  Follow-up with his primary care physician well as further recommendations based on these tests.        History of Present Illness:   Thank you for asking the [facility] Heart Care team to see Som Kohli a 40 y.o.  male  in consultation  to evaluate chest discomfort.  Reports a 2 to 3 weeks ago he experienced some chest pressure that is in variable positions in his upper chest and does tend  to radiate to his left arm that was not clearly worse with exertion but he does have some difficulty fully describing the symptoms.  This symptom resolved over a few day period of time and he did not seek advice.  This past Saturday he began to have the discomfort again.  He states that it is not there all the time but it is there most of the time.  He describes a pressure in his chest with some trend towards radiation to his left arm.  He rates the discomfort 5-6 on a scale of 1-10.  He does report that he has the discomfort at this time.  He reported that he had some symptoms of nausea earlier this morning and awaken with a sense of nausea but no vomiting and has been feeling as if his abdomen is full.  There have been multiple phone calls with his primary care provider's office and it was advised to go to the ER but he did not follow this advice because of his concern regarding the virus.  He was not seen in his primary care physician's office.  He reports associated symptoms of mild shortness of breath more noted if he is bending over.  He does not describe orthopnea, PND, syncope or near syncope.  He denies fever, chills, he denies specific Benjie members or friends with the contact with known virus.  He denies leg discomfort or calf muscle discomfort.    Cardiac risk factors are negative for hypertension, negative for diabetes, positive for hyperlipidemia.  He denies illicit drug use.  He does drink caffeine but states that he discontinued within the past few months.  He states that his mother and father and 2 sisters and brother are all healthy without known medical conditions although he did not know all details.  He does not know any details as it relates to his grandparents.    Denies a history of prior symptoms of chest discomfort.  He states that he does try to exercise walking or jogging..     Past Medical History:   Testicular lump.  Apparently previously evaluated by urology.  Hyperlipidemia.  Most  "recent lipids are from December 2018 where the LDL was 142, cholesterol 214, triglycerides of 70.  He does report some dietary changes but has not had follow-up of his lipids in the in between.    Past Surgical History:   No surgical history    Family History:   Limited gated known by the patient.  Family History   Problem Relation Age of Onset   ? No Medical Problems Mother    ? No Medical Problems Sister    ? No Medical Problems Brother        Social History:    reports that he has never smoked. He has never used smokeless tobacco. He reports that he does not drink alcohol or use drugs.  The primary care physician is Anita Garduno MD  Meds:   Scheduled Meds:  No current outpatient medications on file.     No current facility-administered medications for this visit.        PRN Meds:.    Allergies:   Patient has no known allergies.          Objective:      Physical Exam  171 lb (77.6 kg)  5' 2.5\" (1.588 m)  Wt Readings from Last 3 Encounters:   04/15/20 171 lb (77.6 kg)   05/10/19 171 lb 3 oz (77.7 kg)   12/19/18 169 lb 11.2 oz (77 kg)     Body mass index is 30.78 kg/m .  /80 (Patient Site: Right Arm, Patient Position: Sitting, Cuff Size: Adult Regular)   Pulse 72   Resp 16   Ht 5' 2.5\" (1.588 m)   Wt 171 lb (77.6 kg)   BMI 30.78 kg/m    Right arm blood pressure 120/86  General Appearance:   Alert, cooperative and in no acute distress.   HEENT:  No scleral icterus; the mucous membranes were pink and moist.  Patient has a mask in place.   Neck: JVP within normal limits. No thyromegaly. No HJR   Chest: The spine was straight. The chest was symmetric.   Lungs:   Respirations unlabored; the lungs are clear to auscultation.   Cardiovascular:   S1 and S2 without murmur, clicks or rubs. Brachial, radial, carotid and posterior tibial pulses are intact and symetrical.  No carotid bruits noted   Abdomen:  No organomegaly, masses, bruits, or tenderness. Bowels sounds are present   Extremities: No cyanosis, " clubbing, or edema.   Skin: No xanthelasma.   Neurologic: Mood and affect are appropriate.             Lab Reviewed Personally by myself    Lab Results   Component Value Date     05/10/2019    K 4.1 05/10/2019     05/10/2019    CO2 28 05/10/2019    BUN 15 05/10/2019    CREATININE 0.96 05/10/2019    CALCIUM 9.9 05/10/2019     Lab Results   Component Value Date    CHOL 214 (H) 12/19/2018    TRIG 70 12/19/2018    HDL 58 12/19/2018     No results found for: BNP  Creatinine (mg/dL)   Date Value   05/10/2019 0.96   12/19/2018 0.82   12/04/2017 0.95   09/14/2015 0.82     LDL Calculated (mg/dL)   Date Value   12/19/2018 142 (H)   12/04/2017 179 (H)   09/14/2015 142 (H)     Lab Results   Component Value Date    WBC 4.9 05/10/2019    WBC 5.0 09/14/2015    HGB 16.2 05/10/2019    HCT 48.8 05/10/2019    MCV 90 05/10/2019     05/10/2019       Cardiac Testing:        ECG personally reviewed by myself shows normal sinus rhythm, normal EKG.         Review of Systems:       Review of Systems:   General: WNL  Eyes: WNL  Ears/Nose/Throat: WNL  Lungs: WNL  Heart: Chest Pain  Stomach: WNL  Bladder: WNL  Muscle/Joints: WNL  Skin: WNL  Nervous System: WNL  Mental Health: WNL     Blood: WNL      This note has been dictated using voice recognition software. Any grammatical or context distortions are unintentional and inherent to the software.

## 2021-07-03 NOTE — ADDENDUM NOTE
Addendum Note by Zoie Garduno MD at 5/29/2020  4:45 PM     Author: Zoie Garduno MD Service: -- Author Type: Physician    Filed: 5/29/2020  4:45 PM Encounter Date: 5/27/2020 Status: Signed    : Zoie Garduno MD (Physician)    Addended by: ZOIE GARDUNO on: 5/29/2020 04:45 PM        Modules accepted: Orders

## 2021-07-03 NOTE — ADDENDUM NOTE
Addendum Note by Zoie Garduno MD at 10/7/2020  3:15 PM     Author: Zoie Garduno MD Service: -- Author Type: Physician    Filed: 10/8/2020  7:58 PM Encounter Date: 10/7/2020 Status: Signed    : Zoie Garduno MD (Physician)    Addended by: ZOIE GARDUNO on: 10/8/2020 07:58 PM        Modules accepted: Orders

## 2021-07-03 NOTE — ADDENDUM NOTE
Addendum Note by July Wilson MD at 5/13/2019  3:46 PM     Author: July Wilson MD Service: -- Author Type: Physician    Filed: 5/13/2019  3:46 PM Encounter Date: 5/13/2019 Status: Signed    : July Wilson MD (Physician)    Addended by: JULY WILSON on: 5/13/2019 03:46 PM        Modules accepted: Orders

## 2021-07-03 NOTE — ADDENDUM NOTE
Addendum Note by July Wilson MD at 5/10/2019 11:05 AM     Author: July Wilson MD Service: -- Author Type: Physician    Filed: 5/10/2019 12:29 PM Encounter Date: 5/10/2019 Status: Signed    : July Wilson MD (Physician)    Addended by: JULY WILSON on: 5/10/2019 12:29 PM        Modules accepted: Orders

## 2021-07-03 NOTE — ADDENDUM NOTE
Addendum Note by Zoie Garduno MD at 5/29/2020  4:37 PM     Author: Zoie Garduno MD Service: -- Author Type: Physician    Filed: 5/29/2020  4:37 PM Encounter Date: 5/27/2020 Status: Signed    : Zoie Garduno MD (Physician)    Addended by: ZOIE GARDUNO on: 5/29/2020 04:37 PM        Modules accepted: Orders

## 2021-08-17 ENCOUNTER — OFFICE VISIT (OUTPATIENT)
Dept: FAMILY MEDICINE | Facility: CLINIC | Age: 41
End: 2021-08-17
Payer: COMMERCIAL

## 2021-08-17 VITALS
WEIGHT: 170 LBS | SYSTOLIC BLOOD PRESSURE: 120 MMHG | OXYGEN SATURATION: 97 % | DIASTOLIC BLOOD PRESSURE: 60 MMHG | HEART RATE: 89 BPM | BODY MASS INDEX: 30.6 KG/M2

## 2021-08-17 DIAGNOSIS — H92.02 LEFT EAR PAIN: ICD-10-CM

## 2021-08-17 DIAGNOSIS — R53.83 OTHER FATIGUE: Primary | ICD-10-CM

## 2021-08-17 LAB
ERYTHROCYTE [DISTWIDTH] IN BLOOD BY AUTOMATED COUNT: 12.6 % (ref 10–15)
HCT VFR BLD AUTO: 50.3 % (ref 40–53)
HGB BLD-MCNC: 16.5 G/DL (ref 13.3–17.7)
MCH RBC QN AUTO: 28.8 PG (ref 26.5–33)
MCHC RBC AUTO-ENTMCNC: 32.8 G/DL (ref 31.5–36.5)
MCV RBC AUTO: 88 FL (ref 78–100)
PLATELET # BLD AUTO: 141 10E3/UL (ref 150–450)
RBC # BLD AUTO: 5.72 10E6/UL (ref 4.4–5.9)
TSH SERPL DL<=0.005 MIU/L-ACNC: 2.2 UIU/ML (ref 0.3–5)
WBC # BLD AUTO: 3.7 10E3/UL (ref 4–11)

## 2021-08-17 PROCEDURE — 99213 OFFICE O/P EST LOW 20 MIN: CPT | Performed by: STUDENT IN AN ORGANIZED HEALTH CARE EDUCATION/TRAINING PROGRAM

## 2021-08-17 PROCEDURE — 85027 COMPLETE CBC AUTOMATED: CPT | Performed by: STUDENT IN AN ORGANIZED HEALTH CARE EDUCATION/TRAINING PROGRAM

## 2021-08-17 PROCEDURE — 36415 COLL VENOUS BLD VENIPUNCTURE: CPT | Performed by: STUDENT IN AN ORGANIZED HEALTH CARE EDUCATION/TRAINING PROGRAM

## 2021-08-17 PROCEDURE — 84443 ASSAY THYROID STIM HORMONE: CPT | Performed by: STUDENT IN AN ORGANIZED HEALTH CARE EDUCATION/TRAINING PROGRAM

## 2021-08-17 RX ORDER — FLUTICASONE PROPIONATE 50 MCG
1 SPRAY, SUSPENSION (ML) NASAL DAILY
Qty: 18.2 ML | Refills: 1 | Status: SHIPPED | OUTPATIENT
Start: 2021-08-17 | End: 2021-10-26

## 2021-08-22 ENCOUNTER — OFFICE VISIT (OUTPATIENT)
Dept: FAMILY MEDICINE | Facility: CLINIC | Age: 41
End: 2021-08-22
Payer: COMMERCIAL

## 2021-08-22 VITALS
TEMPERATURE: 98.7 F | DIASTOLIC BLOOD PRESSURE: 75 MMHG | SYSTOLIC BLOOD PRESSURE: 109 MMHG | HEART RATE: 81 BPM | OXYGEN SATURATION: 94 %

## 2021-08-22 DIAGNOSIS — U07.1 PNEUMONIA DUE TO 2019 NOVEL CORONAVIRUS: Primary | ICD-10-CM

## 2021-08-22 DIAGNOSIS — R07.9 CHEST PAIN, UNSPECIFIED TYPE: ICD-10-CM

## 2021-08-22 DIAGNOSIS — J12.82 PNEUMONIA DUE TO 2019 NOVEL CORONAVIRUS: Primary | ICD-10-CM

## 2021-08-22 PROCEDURE — 99214 OFFICE O/P EST MOD 30 MIN: CPT | Performed by: FAMILY MEDICINE

## 2021-08-23 NOTE — PROGRESS NOTES
Assessment & Plan     Pneumonia due to 2019 novel coronavirus  Chest x-ray ordered and personally reviewed by myself showing patchy infiltrates in his bilateral lower and mid lung fields, likely consistent with COVID-19.  Reviewed radiology report showing probable COVID-19 pneumonia.  Discussed this diagnosis with patient.  Patient denies PCR testing for COVID-19 today.  Patient also declines any blood work or EKG for evaluation of his chest discomfort.  Given the chest x-ray findings however I think COVID-19 is likely the cause of his symptoms and we discussed symptomatic care at home and typical course of symptoms as well as need for isolation for 10 full days.  Recommend antipyretics, antitussives, rest, and fluids.  Discussed signs and symptoms of worsening illness and when to seek care in the emergency department including shortness of breath, chest pain, significantly worsening fatigue or weakness, etc.  Patient currently appears clinically stable.  Patient expresses understanding.      Chest pain, unspecified type    - XR Chest 2 Views; Future  - XR Chest 2 Views    Symone Morton MD  [facility] is a 41 year old who presents for the following health issues     HPI     Acute Illness  Acute illness concerns: 1 week history of feeling a heavy sensation in his chest along with a slight cough and overall feeling very fatigued.  At times it feels like it is hard for him to take a deep breath in and this tends to trigger a cough.  He has not had any body aches and has not had a fever.  He has not been vaccinated against COVID-19 and has not had a prior COVID-19 infection.  He denies any headache, sore throat, ear pain, or nasal congestion.    Review of Systems   Constitutional, HEENT, cardiovascular, pulmonary, gi and gu systems are negative, except as otherwise noted.      Objective    /75 (BP Location: Right arm, Patient Position: Sitting, Cuff Size: Adult  Regular)   Pulse 81   Temp 98.7  F (37.1  C) (Oral)   SpO2 94%   There is no height or weight on file to calculate BMI.     Physical Exam   GENERAL: Patient appears mildly ill appearing but in no distress.  Able to speak in complete sentences.  Respirations are unlabored.  Nontoxic appearing.  EYES: Eyes grossly normal to inspection, PERRL and conjunctivae and sclerae normal  HENT: ear canals and TM's normal, nose and mouth without ulcers or lesions  NECK: no adenopathy, no asymmetry, masses, or scars and thyroid normal to palpation  RESP: Few scattered rhonchi heard throughout his lower lung fields.  No wheezes.  Overall good aeration.  CV: regular rate and rhythm, normal S1 S2, no S3 or S4, no murmur, click or rub, no peripheral edema and peripheral pulses strong  ABDOMEN: soft, nontender, no hepatosplenomegaly, no masses and bowel sounds normal  MS: no gross musculoskeletal defects noted, no edema  SKIN: no suspicious lesions or rashes    CXR - Reviewed and interpreted by me --bilateral infiltrates in the lower and mid lung fields consistent with COVID-19 pneumonia.

## 2021-10-25 DIAGNOSIS — H92.02 LEFT EAR PAIN: ICD-10-CM

## 2021-10-26 RX ORDER — FLUTICASONE PROPIONATE 50 MCG
SPRAY, SUSPENSION (ML) NASAL
Qty: 48 G | Refills: 3 | Status: SHIPPED | OUTPATIENT
Start: 2021-10-26

## 2021-10-26 NOTE — TELEPHONE ENCOUNTER
"Last Written Prescription Date:  8/17/21  Last Fill Quantity: 18.2,  # refills: 1   Last office visit provider:  8/17/21     Requested Prescriptions   Pending Prescriptions Disp Refills     fluticasone (FLONASE) 50 MCG/ACT nasal spray [Pharmacy Med Name: FLUTICASONE PROP 50 MCG SPRAY] 16 mL 1     Sig: INSTILL 1 SPRAY INTO BOTH NOSTRILS DAILY       Nasal Allergy Protocol Passed - 10/25/2021 12:26 AM        Passed - Patient is age 12 or older        Passed - Recent (12 mo) or future (30 days) visit within the authorizing provider's specialty     Patient has had an office visit with the authorizing provider or a provider within the authorizing providers department within the previous 12 mos or has a future within next 30 days. See \"Patient Info\" tab in inbasket, or \"Choose Columns\" in Meds & Orders section of the refill encounter.              Passed - Medication is active on med list             Solitario Silverman RN 10/26/21 10:10 AM  "

## 2021-12-08 ENCOUNTER — NURSE TRIAGE (OUTPATIENT)
Dept: NURSING | Facility: CLINIC | Age: 41
End: 2021-12-08
Payer: COMMERCIAL

## 2021-12-08 NOTE — TELEPHONE ENCOUNTER
"RN Triage:    Has \"dull ache\" on right side of chest around nipple area x 3 hours.  No radiating.  Not associated with coughing or movement.  No precipitating event.  Denies diaphoresis and shortness of breath.  Has had this before and \"they never find anything\".  Precautions discussed.  Caller was transferred to  for appointment today.    Abby Hernandez RN 12/08/21 12:35 PM  [facility] Nurse Advisor            Reason for Disposition    Chest pain lasting longer than 5 minutes and occurred in last 3 days (72 hours) (Exception: feels exactly the same as previously diagnosed heartburn and has accompanying sour taste in mouth)    Additional Information    Negative: Severe difficulty breathing (e.g., struggling for each breath, speaks in single words)    Negative: Passed out (i.e., fainted, collapsed and was not responding)    Negative: Difficult to awaken or acting confused (e.g., disoriented, slurred speech)    Negative: Shock suspected (e.g., cold/pale/clammy skin, too weak to stand, low BP, rapid pulse)    Negative: Chest pain lasting longer than 5 minutes and ANY of the following:* Over 45 years old* Over 30 years old and at least one cardiac risk factor (e.g., diabetes, high blood pressure, high cholesterol, smoker, or strong family history of heart disease)* History of heart disease (i.e., angina, heart attack, heart failure, bypass surgery, takes nitroglycerin)* Pain is crushing, pressure-like, or heavy    Negative: Heart beating < 50 beats per minute OR > 140 beats per minute    Negative: Visible sweat on face or sweat dripping down face    Negative: Sounds like a life-threatening emergency to the triager    Negative: Followed an injury to chest    Negative: Pain also in shoulder(s) or arm(s) or jaw    Negative: SEVERE chest pain    Negative: Difficulty breathing    Negative: Cocaine use within last 3 days    Negative: Major surgery in the past month    Negative: Hip or leg fracture (broken " bone) in past month (or had cast on leg or ankle in past month)    Negative: Illness requiring prolonged bedrest in past month (e.g., immobilization, long hospital stay)    Negative: Long-distance travel in past month (e.g., car, bus, train, plane; with trip lasting 6 or more hours)    Negative: History of prior 'blood clot' in leg or lungs (i.e., deep vein thrombosis, pulmonary embolism)    Negative: History of inherited increased risk of blood clots (e.g., Factor 5 Leiden, Anti-thrombin 3, Protein C or Protein S deficiency, Prothrombin mutation)    Negative: Heart beating irregularly or very rapidly    Protocols used: CHEST PAIN-A-[place]

## 2021-12-09 ENCOUNTER — OFFICE VISIT (OUTPATIENT)
Dept: FAMILY MEDICINE | Facility: CLINIC | Age: 41
End: 2021-12-09
Payer: COMMERCIAL

## 2021-12-09 ENCOUNTER — TELEPHONE (OUTPATIENT)
Dept: CARDIOLOGY | Facility: CLINIC | Age: 41
End: 2021-12-09
Payer: COMMERCIAL

## 2021-12-09 VITALS
RESPIRATION RATE: 12 BRPM | DIASTOLIC BLOOD PRESSURE: 86 MMHG | HEART RATE: 67 BPM | SYSTOLIC BLOOD PRESSURE: 125 MMHG | TEMPERATURE: 97.6 F | WEIGHT: 171 LBS | BODY MASS INDEX: 30.78 KG/M2

## 2021-12-09 DIAGNOSIS — E66.09 CLASS 1 OBESITY DUE TO EXCESS CALORIES WITHOUT SERIOUS COMORBIDITY WITH BODY MASS INDEX (BMI) OF 30.0 TO 30.9 IN ADULT: ICD-10-CM

## 2021-12-09 DIAGNOSIS — E78.2 MIXED HYPERLIPIDEMIA: ICD-10-CM

## 2021-12-09 DIAGNOSIS — R82.998 DARK URINE: ICD-10-CM

## 2021-12-09 DIAGNOSIS — E66.811 CLASS 1 OBESITY DUE TO EXCESS CALORIES WITHOUT SERIOUS COMORBIDITY WITH BODY MASS INDEX (BMI) OF 30.0 TO 30.9 IN ADULT: ICD-10-CM

## 2021-12-09 DIAGNOSIS — R07.9 CHEST PAIN, UNSPECIFIED TYPE: Primary | ICD-10-CM

## 2021-12-09 LAB
ALBUMIN SERPL-MCNC: 4.7 G/DL (ref 3.5–5)
ALBUMIN UR-MCNC: NEGATIVE MG/DL
ALP SERPL-CCNC: 61 U/L (ref 45–120)
ALT SERPL W P-5'-P-CCNC: 47 U/L (ref 0–45)
ANION GAP SERPL CALCULATED.3IONS-SCNC: 15 MMOL/L (ref 5–18)
APPEARANCE UR: CLEAR
AST SERPL W P-5'-P-CCNC: 31 U/L (ref 0–40)
BILIRUB SERPL-MCNC: 1.9 MG/DL (ref 0–1)
BILIRUB UR QL STRIP: NEGATIVE
BUN SERPL-MCNC: 18 MG/DL (ref 8–22)
CALCIUM SERPL-MCNC: 10 MG/DL (ref 8.5–10.5)
CHLORIDE BLD-SCNC: 102 MMOL/L (ref 98–107)
CHOLEST SERPL-MCNC: 243 MG/DL
CO2 SERPL-SCNC: 24 MMOL/L (ref 22–31)
COLOR UR AUTO: YELLOW
CREAT SERPL-MCNC: 0.94 MG/DL (ref 0.7–1.3)
ERYTHROCYTE [DISTWIDTH] IN BLOOD BY AUTOMATED COUNT: 12.8 % (ref 10–15)
FASTING STATUS PATIENT QL REPORTED: YES
GFR SERPL CREATININE-BSD FRML MDRD: >90 ML/MIN/1.73M2
GLUCOSE BLD-MCNC: 83 MG/DL (ref 70–125)
GLUCOSE UR STRIP-MCNC: NEGATIVE MG/DL
HBA1C MFR BLD: 5.3 % (ref 0–5.6)
HCT VFR BLD AUTO: 48.1 % (ref 40–53)
HDLC SERPL-MCNC: 60 MG/DL
HGB BLD-MCNC: 15.9 G/DL (ref 13.3–17.7)
HGB UR QL STRIP: NEGATIVE
KETONES UR STRIP-MCNC: 40 MG/DL
LDLC SERPL CALC-MCNC: 172 MG/DL
LEUKOCYTE ESTERASE UR QL STRIP: NEGATIVE
LIPASE SERPL-CCNC: 50 U/L (ref 0–52)
MCH RBC QN AUTO: 28.4 PG (ref 26.5–33)
MCHC RBC AUTO-ENTMCNC: 33.1 G/DL (ref 31.5–36.5)
MCV RBC AUTO: 86 FL (ref 78–100)
NITRATE UR QL: NEGATIVE
PH UR STRIP: 6 [PH] (ref 5–8)
PLATELET # BLD AUTO: 187 10E3/UL (ref 150–450)
POTASSIUM BLD-SCNC: 4.1 MMOL/L (ref 3.5–5)
PROT SERPL-MCNC: 8 G/DL (ref 6–8)
RBC # BLD AUTO: 5.59 10E6/UL (ref 4.4–5.9)
SODIUM SERPL-SCNC: 141 MMOL/L (ref 136–145)
SP GR UR STRIP: 1.02 (ref 1–1.03)
TRIGL SERPL-MCNC: 54 MG/DL
UROBILINOGEN UR STRIP-ACNC: 1 E.U./DL
WBC # BLD AUTO: 4.4 10E3/UL (ref 4–11)

## 2021-12-09 PROCEDURE — 80053 COMPREHEN METABOLIC PANEL: CPT | Performed by: PHYSICIAN ASSISTANT

## 2021-12-09 PROCEDURE — 83036 HEMOGLOBIN GLYCOSYLATED A1C: CPT | Performed by: PHYSICIAN ASSISTANT

## 2021-12-09 PROCEDURE — 36415 COLL VENOUS BLD VENIPUNCTURE: CPT | Performed by: PHYSICIAN ASSISTANT

## 2021-12-09 PROCEDURE — 81003 URINALYSIS AUTO W/O SCOPE: CPT | Performed by: PHYSICIAN ASSISTANT

## 2021-12-09 PROCEDURE — 85027 COMPLETE CBC AUTOMATED: CPT | Performed by: PHYSICIAN ASSISTANT

## 2021-12-09 PROCEDURE — 99214 OFFICE O/P EST MOD 30 MIN: CPT | Performed by: PHYSICIAN ASSISTANT

## 2021-12-09 PROCEDURE — 83690 ASSAY OF LIPASE: CPT | Performed by: PHYSICIAN ASSISTANT

## 2021-12-09 PROCEDURE — 80061 LIPID PANEL: CPT | Performed by: PHYSICIAN ASSISTANT

## 2021-12-09 NOTE — TELEPHONE ENCOUNTER
Pt c/o intermittent, annoying, dull pain that moves around his chest, most notable when he lays down.  He adds that there is also a tickling feeling, and these are the same symptoms he had prior to his referral to cardiology April 2020.  Pt has not tried any apap, ibuprofen, or acid reflux medication.    Pt sedentary, not working currently, and sits at his computer all day.  Pt does report he goes for a walk a few times weekly, but hasn't done that recently d/t cold weather.  Pt will see another provider in primary care today, set up appt with PCP 12/24/21, and will see Dr Chopra in January 6, 2022.    Stress echo done 4/24/20 was normal.  Pt has not had follow-up appt with Dr Chopra since.  Instructed pt to go to ED if discomfort is causing him that much concern.  Pt verbalized understanding and had no other questions.  -emily

## 2021-12-09 NOTE — TELEPHONE ENCOUNTER
----- Message from Symone Gaytan sent at 12/9/2021  9:43 AM CST -----  Regarding: MDG  General phone call:    Caller: Som  Primary cardiologist: MDG  Detailed reason for call: Pt is calling and states he is experiencing dull sensation around his chest that comes and goes. Pt would like a call back to discuss    Best phone number: [phone]  Best time to contact: anytime  Ok to leave a detailedmessage? no  Device? no    Additional Info: Pt is scheduled to see MDG on 1/06/22

## 2021-12-10 ENCOUNTER — TELEPHONE (OUTPATIENT)
Dept: FAMILY MEDICINE | Facility: CLINIC | Age: 41
End: 2021-12-10
Payer: COMMERCIAL

## 2021-12-10 NOTE — TELEPHONE ENCOUNTER
[facility] #1for pt to call back.    Okay to relay message..      ----- Message from Porsha Licea PA-C sent at 12/10/2021  9:31 AM CST -----  His chest x-ray was normal.I am not sure of the cause of his chest discomfort, but I do not think it is anything serious.I would recommend he follow-up with his PCP at his upcoming visit.  Normal urine tests.  Normal average blood sugars.His cholesterol is a little high.  He should review this with PCP and discuss if medication is appropriate.One of his liver tests was just a little bit elevated.  This is probably nothing to worry about.  He can recheck this sometime in the future, not urgent.  All other labs normal.

## 2021-12-10 NOTE — PROGRESS NOTES
Subjective:    Som Kohli is a 41 year old male who presents with chief complaint of chest discomfort.  He has had episodes of this chest pressure in the past.  However this time it lasted a lot longer than normal.  Symptoms have been present for about a week.  He says he has a dull feeling in his chest that is moving around his chest, can radiate to the back.  He also describes it as pressure.  He says now pain or pressure is radiated to his back, and it is moving around.  Both of these are different than similar episodes he has had in the past.  He says he had a recent negative Covid test.  No dyspnea.  He is able to run without problems.  He notes some heel pain.  Also notes some pain in the shoulders for the past few days, right worse than left.  Occasional sharp gas epigastric pain.  He denies any recent falls or injuries that could have accounted for his symptoms.  He has an office visit with his PCP on December 23.  However he was worried about the symptoms and did not want to wait.  He cannot identify any triggers for the chest pressure, he says they happen at random.    I reviewed office note from 10/7/2020 where they discussed previous grossly normal work-up.  Patient reports he saw cardiology, had a normal stress echo.  I reviewed echo report today.  He also saw GI and had some exams through them.  He again says everything was normal.      Patient Active Problem List   Diagnosis     HBV (hepatitis B virus) infection       Current Outpatient Medications:      fluticasone (FLONASE) 50 MCG/ACT nasal spray, INSTILL 1 SPRAY INTO BOTH NOSTRILS DAILY (Patient not taking: Reported on 12/9/2021), Disp: 48 g, Rfl: 3      Objective:   Allergies:  Patient has no known allergies.    Vitals:  Vitals:    12/09/21 1322   BP: 125/86   BP Location: Left arm   Patient Position: Sitting   Cuff Size: Adult Regular   Pulse: 67   Resp: 12   Temp: 97.6  F (36.4  C)   TempSrc: Temporal   Weight: 77.6 kg (171 lb)       Body mass  index is 30.78 kg/m .    Vital signs reviewed.  General: Patient is alert and oriented x 3, in no apparent distress, has an N95 mask on, has rubber gloves on both of his hands, does appear somewhat anxious  Cardiac: regular rate and rhythm, no murmurs  Pulmonary: lungs clear to auscultation bilaterally, no crackles, rales, rhonchi, or wheezing noted  Musculoskeletal: No reproducible pain with palpation of anterior chest    Results for orders placed or performed in visit on 12/09/21   XR Chest 2 Views     Status: None    Narrative    EXAM: XR CHEST 2 VW  LOCATION: [facility]  DATE/TIME: 12/9/2021 2:09 PM    INDICATION:  Chest pain, unspecified type  COMPARISON: 08/22/2021      Impression    IMPRESSION: Negative chest. Resolution of the previous infiltrates.   Results for orders placed or performed in visit on 12/09/21   CBC with platelets     Status: Normal   Result Value Ref Range    WBC Count 4.4 4.0 - 11.0 10e3/uL    RBC Count 5.59 4.40 - 5.90 10e6/uL    Hemoglobin 15.9 13.3 - 17.7 g/dL    Hematocrit 48.1 40.0 - 53.0 %    MCV 86 78 - 100 fL    MCH 28.4 26.5 - 33.0 pg    MCHC 33.1 31.5 - 36.5 g/dL    RDW 12.8 10.0 - 15.0 %    Platelet Count 187 150 - 450 10e3/uL   Hemoglobin A1c     Status: Normal   Result Value Ref Range    Hemoglobin A1C 5.3 0.0 - 5.6 %   UA reflex to Microscopic     Status: Abnormal   Result Value Ref Range    Color Urine Yellow Colorless, Straw, Light Yellow, Yellow    Appearance Urine Clear Clear    Glucose Urine Negative Negative mg/dL    Bilirubin Urine Negative Negative    Ketones Urine 40  (A) Negative mg/dL    Specific Gravity Urine 1.025 1.005 - 1.030    Blood Urine Negative Negative    pH Urine 6.0 5.0 - 8.0    Protein Albumin Urine Negative Negative mg/dL    Urobilinogen Urine 1.0 0.2, 1.0 E.U./dL    Nitrite Urine Negative Negative    Leukocyte Esterase Urine Negative Negative    Narrative    Microscopic not indicated   Other labs pending.    I personally  reviewed normal chest x-ray, no pneumonia or other abnormal findings.  EXAM: XR CHEST 2 VW  LOCATION: [facility]  DATE/TIME: 12/9/2021 2:09 PM   INDICATION:  Chest pain, unspecified type  COMPARISON: 08/22/2021                                                                IMPRESSION: Negative chest. Resolution of the previous infiltrates.    Assessment and Plan:   1. Chest pain, unspecified type  Differential includes most likely GERD versus musculoskeletal chest wall pain.  He has had significant work-up in the past that has been grossly normal.  I will follow-up with today's labs.  If all normal, would have him follow-up with his PCP.  He declined a trial of GERD medication today.  - XR Chest 2 Views; Future  - CBC with platelets  - Comprehensive metabolic panel  - Hemoglobin A1c  - Lipase; Future  - Lipase    2. Mixed hyperlipidemia  He is fasting today and wants to get his cholesterol checked.  I will follow up with results.  - Lipid Profile    3. Class 1 obesity due to excess calories without serious comorbidity with body mass index (BMI) of 30.0 to 30.9 in adult  I will follow up with results.  - Hemoglobin A1c    4. Dark urine  He is concerned because his urine was a darker color recently.  I think this just means that he was less hydrated.  Urinalysis looks grossly normal.  - UA reflex to Microscopic; Future  - UA reflex to Microscopic       This dictation uses voice recognition software, which may contain typographical errors.

## 2022-01-12 DIAGNOSIS — R06.00 DYSPNEA: ICD-10-CM

## 2022-01-12 DIAGNOSIS — R07.89 ATYPICAL CHEST PAIN: Primary | ICD-10-CM

## 2022-01-12 DIAGNOSIS — R07.89 CHEST DISCOMFORT: ICD-10-CM

## 2022-01-12 DIAGNOSIS — E78.5 HYPERLIPIDEMIA LDL GOAL <100: ICD-10-CM

## 2022-01-12 NOTE — PROGRESS NOTES
Follow-up order entered.  -emily    1/7 PT WCB NEXT WEEK TO R/S NO SHOW WHEN HE KNOWS HIS SCHEDULE - ana  1 yr follow up with MDG. Pt request. ELGIN hodler

## 2022-04-25 ENCOUNTER — OFFICE VISIT (OUTPATIENT)
Dept: FAMILY MEDICINE | Facility: CLINIC | Age: 42
End: 2022-04-25
Payer: COMMERCIAL

## 2022-04-25 VITALS
SYSTOLIC BLOOD PRESSURE: 119 MMHG | BODY MASS INDEX: 29.53 KG/M2 | RESPIRATION RATE: 20 BRPM | HEIGHT: 64 IN | WEIGHT: 173 LBS | DIASTOLIC BLOOD PRESSURE: 82 MMHG | HEART RATE: 76 BPM

## 2022-04-25 DIAGNOSIS — E78.2 MIXED HYPERLIPIDEMIA: ICD-10-CM

## 2022-04-25 DIAGNOSIS — B18.1 CHRONIC TYPE B VIRAL HEPATITIS (H): ICD-10-CM

## 2022-04-25 DIAGNOSIS — R39.89 ABNORMAL URINE COLOR: ICD-10-CM

## 2022-04-25 DIAGNOSIS — R10.11 RUQ PAIN: Primary | ICD-10-CM

## 2022-04-25 LAB
ALBUMIN SERPL-MCNC: 4.5 G/DL (ref 3.5–5)
ALBUMIN UR-MCNC: NEGATIVE MG/DL
ALP SERPL-CCNC: 65 U/L (ref 45–120)
ALT SERPL W P-5'-P-CCNC: 62 U/L (ref 0–45)
APPEARANCE UR: CLEAR
AST SERPL W P-5'-P-CCNC: 32 U/L (ref 0–40)
BILIRUB DIRECT SERPL-MCNC: 0.4 MG/DL
BILIRUB SERPL-MCNC: 1.4 MG/DL (ref 0–1)
BILIRUB UR QL STRIP: NEGATIVE
CHOLEST SERPL-MCNC: 222 MG/DL
COLOR UR AUTO: YELLOW
ERYTHROCYTE [DISTWIDTH] IN BLOOD BY AUTOMATED COUNT: 12.8 % (ref 10–15)
FASTING STATUS PATIENT QL REPORTED: YES
GLUCOSE UR STRIP-MCNC: NEGATIVE MG/DL
HCT VFR BLD AUTO: 47.6 % (ref 40–53)
HDLC SERPL-MCNC: 55 MG/DL
HGB BLD-MCNC: 15.8 G/DL (ref 13.3–17.7)
HGB UR QL STRIP: NEGATIVE
KETONES UR STRIP-MCNC: 15 MG/DL
LDLC SERPL CALC-MCNC: 157 MG/DL
LEUKOCYTE ESTERASE UR QL STRIP: NEGATIVE
LIPASE SERPL-CCNC: 65 U/L (ref 0–52)
MCH RBC QN AUTO: 28.9 PG (ref 26.5–33)
MCHC RBC AUTO-ENTMCNC: 33.2 G/DL (ref 31.5–36.5)
MCV RBC AUTO: 87 FL (ref 78–100)
NITRATE UR QL: NEGATIVE
PH UR STRIP: 5.5 [PH] (ref 5–8)
PLATELET # BLD AUTO: 185 10E3/UL (ref 150–450)
PROT SERPL-MCNC: 8 G/DL (ref 6–8)
RBC # BLD AUTO: 5.46 10E6/UL (ref 4.4–5.9)
SP GR UR STRIP: >=1.03 (ref 1–1.03)
TRIGL SERPL-MCNC: 52 MG/DL
UROBILINOGEN UR STRIP-ACNC: 0.2 E.U./DL
WBC # BLD AUTO: 4.1 10E3/UL (ref 4–11)

## 2022-04-25 PROCEDURE — 80061 LIPID PANEL: CPT | Performed by: PHYSICIAN ASSISTANT

## 2022-04-25 PROCEDURE — 36415 COLL VENOUS BLD VENIPUNCTURE: CPT | Performed by: PHYSICIAN ASSISTANT

## 2022-04-25 PROCEDURE — 83690 ASSAY OF LIPASE: CPT | Performed by: PHYSICIAN ASSISTANT

## 2022-04-25 PROCEDURE — 99214 OFFICE O/P EST MOD 30 MIN: CPT | Performed by: PHYSICIAN ASSISTANT

## 2022-04-25 PROCEDURE — 85027 COMPLETE CBC AUTOMATED: CPT | Performed by: PHYSICIAN ASSISTANT

## 2022-04-25 PROCEDURE — 81003 URINALYSIS AUTO W/O SCOPE: CPT | Performed by: PHYSICIAN ASSISTANT

## 2022-04-25 PROCEDURE — 80076 HEPATIC FUNCTION PANEL: CPT | Performed by: PHYSICIAN ASSISTANT

## 2022-04-25 NOTE — PATIENT INSTRUCTIONS
Someone should be calling you within 2-3 days to schedule your ultrasound appointment.    If you would like to schedule your ultrasound yourself, call #[phone].

## 2022-04-25 NOTE — PROGRESS NOTES
"  Subjective:    Som Kohli is a 42 year old male who presents with chief complaint of abdominal pain.    I saw patient for this concern in December 2021.  See that note for details.  Work-up was grossly normal.  He has also had grossly normal work-up for chest pain in the past, including visit with cardiologist.  He is following with GI for hepatitis B.    Last available note from [facility] was 10/12/2020.  I reviewed the note today.  GI visit was with hepatology clinic for follow-up of hepatitis B.  At that visit, he did talk about his upper abdominal pain.  Pain has been chronic for many years.  Provider reported he had upper endoscopy and colonoscopy in 2012, along with MRCP, which I believe were all grossly normal.  Provider felt perhaps abdominal pain was not related to a GI issue.    Today, patient says he has symptoms very similar to what he had in December but \"more stuff.\"  He has had right upper quadrant discomfort for about 3 days.  Coming and going.  Overall he feels like his stomach has been irritated for about a month.  Appetite is less than normal, feeling full even though he is eating small amounts of food.  He denies any problems with constipation, though he says normally he has a bowel movement every 1 to 2 days, and now he is having a bowel movement every 2 to 3 days.  He says his bowel movements are tan in color.  Urine is cloudy.  Mild nausea this morning.  History of negative H. pylori testing 2019.    Of note, he is pretty sure he had a tetanus shot in 2014.  Declines tetanus shot today.    Patient Active Problem List   Diagnosis     Chronic type B viral hepatitis (H)       Current Outpatient Medications:      fluticasone (FLONASE) 50 MCG/ACT nasal spray, INSTILL 1 SPRAY INTO BOTH NOSTRILS DAILY (Patient not taking: No sig reported), Disp: 48 g, Rfl: 3      Objective:   Allergies:  Patient has no known allergies.    Vitals:  Vitals:    04/25/22 1507   BP: 119/82   BP Location: Left arm " "  Patient Position: Sitting   Cuff Size: Adult Large   Pulse: 76   Resp: 20   Weight: 78.5 kg (173 lb)   Height: 1.613 m (5' 3.5\")       Body mass index is 30.16 kg/m .    Vital signs reviewed.  General: Patient is alert and oriented x 3, in no apparent distress  Cardiac: regular rate and rhythm, no murmurs  Pulmonary: lungs clear to auscultation bilaterally, no crackles, rales, rhonchi, or wheezing noted  Abdomen: Non tender to palpation, no hepatosplenomegaly, negative Saldivar's sign, no pain over McBurney's point, positive bowel sounds, no masses palpable      Results for orders placed or performed in visit on 04/25/22   CBC with platelets     Status: Normal   Result Value Ref Range    WBC Count 4.1 4.0 - 11.0 10e3/uL    RBC Count 5.46 4.40 - 5.90 10e6/uL    Hemoglobin 15.8 13.3 - 17.7 g/dL    Hematocrit 47.6 40.0 - 53.0 %    MCV 87 78 - 100 fL    MCH 28.9 26.5 - 33.0 pg    MCHC 33.2 31.5 - 36.5 g/dL    RDW 12.8 10.0 - 15.0 %    Platelet Count 185 150 - 450 10e3/uL   UA reflex to Microscopic     Status: Abnormal   Result Value Ref Range    Color Urine Yellow Colorless, Straw, Light Yellow, Yellow    Appearance Urine Clear Clear    Glucose Urine Negative Negative mg/dL    Bilirubin Urine Negative Negative    Ketones Urine 15  (A) Negative mg/dL    Specific Gravity Urine >=1.030 1.005 - 1.030    Blood Urine Negative Negative    pH Urine 5.5 5.0 - 8.0    Protein Albumin Urine Negative Negative mg/dL    Urobilinogen Urine 0.2 0.2, 1.0 E.U./dL    Nitrite Urine Negative Negative    Leukocyte Esterase Urine Negative Negative    Narrative    Microscopic not indicated     Other labs pending.    Assessment and Plan:   1. RUQ pain  Chronic with current flare-up.  Differential includes nonspecific abdominal pain, constipation, somatization, less likely gallbladder or liver disease, infection, Hep B flare, or other.  Exam is grossly normal today.  Patient has had work-up for this problem in the past and everything has been " normal.  He is sure that his symptoms are not due to anxiety or stress.  He is about due for his liver ultrasound for hepatitis B surveillance, so that was ordered today.  I will follow-up with results and contact patient.  He has his regular hepatitis B follow-up visit with specialist next week.  He can certainly follow-up with them if symptoms have not improved.  - Hepatic panel (Albumin, ALT, AST, Bili, Alk Phos, TP); Future  - US Abdomen Limited; Future  - Lipase; Future  - CBC with platelets  - Hepatic panel (Albumin, ALT, AST, Bili, Alk Phos, TP)  - Lipase    2. Chronic type B viral hepatitis (H)  Surveillance hepatitis B ultrasound was ordered today.  At the end of the visit, he says he actually has an appointment with a hepatologist later this week.  He declines getting specific hepatitis B labs drawn today, says he will get them at the specialist's office.  - Hepatic panel (Albumin, ALT, AST, Bili, Alk Phos, TP); Future  - US Abdomen Limited; Future  - Hepatic panel (Albumin, ALT, AST, Bili, Alk Phos, TP)    3. Abnormal urine color  This is likely normal variant.  I have low suspicion for infection or other abnormalities.  I will follow-up with urine results.  - UA reflex to Microscopic; Future  - UA reflex to Microscopic    4. Mixed hyperlipidemia  Previous check about 5 months ago showed elevated cholesterol, specifically elevated LDL.  Patient wants to recheck that today.  He is fasting.    - Lipid Profile       This dictation uses voice recognition software, which may contain typographical errors.

## 2022-04-26 ENCOUNTER — HOSPITAL ENCOUNTER (OUTPATIENT)
Dept: ULTRASOUND IMAGING | Facility: CLINIC | Age: 42
Discharge: HOME | End: 2022-04-26
Attending: PHYSICIAN ASSISTANT | Admitting: PHYSICIAN ASSISTANT
Payer: COMMERCIAL

## 2022-04-26 ENCOUNTER — NURSE TRIAGE (OUTPATIENT)
Dept: NURSING | Facility: CLINIC | Age: 42
End: 2022-04-26
Payer: COMMERCIAL

## 2022-04-26 DIAGNOSIS — B18.1 CHRONIC TYPE B VIRAL HEPATITIS (H): ICD-10-CM

## 2022-04-26 DIAGNOSIS — R10.11 RUQ PAIN: ICD-10-CM

## 2022-04-26 PROBLEM — E78.2 MIXED HYPERLIPIDEMIA: Status: ACTIVE | Noted: 2022-04-26

## 2022-04-26 PROCEDURE — 76705 ECHO EXAM OF ABDOMEN: CPT

## 2022-04-26 NOTE — TELEPHONE ENCOUNTER
His blood cell counts were normal.  His urine was normal.    His pancreas test was just a little bit high.  His liver tests looked pretty good, about the same as the last time we checked.  He should review his liver and pancreas tests with the GI doctor when he sees them next week.    His cholesterol is still high, a little bit better than the last time we checked.  He should continue to work on healthy eating and exercise.  If he would like to see a nutritionist to discuss the best foods to eat, I can put in a referral, let me know.  Cholesterol does not need to be rechecked until 1 year from now.    His ultrasound was normal.  Normal gallbladder, normal bile ducts, normal liver, normal right kidney, normal pancreas.    As I told him yesterday, I think his symptoms could be due to to constipation or possibly anxiety.  He does not have any acute/emergency things wrong with his abdomen based on the evaluation we did.  He can certainly review all this with the GI doctor when he sees them.

## 2022-04-26 NOTE — TELEPHONE ENCOUNTER
Seen in [facility] yesterday and labs were done. He does not have MyChart, but would like his results.     Result information given to patient. He would like TRISTIN Licea PA-C to contact him with the interpretation of his results. Message will be forwarded to provider.     Gladys Meyer RN Triage Nurse Advisor 5:06 PM 4/26/2022

## 2022-04-27 ENCOUNTER — TRANSFERRED RECORDS (OUTPATIENT)
Dept: HEALTH INFORMATION MANAGEMENT | Facility: CLINIC | Age: 42
End: 2022-04-27
Payer: COMMERCIAL

## 2022-04-27 NOTE — TELEPHONE ENCOUNTER
RN called patient to relay MAGALIS Licea, message below.       Patient stated he has GI appointment today. Patient asked to print out the lab results and provider's comment for him, so he can pick it up today.         Patient has no other questions at this time.      RN will place the lab results in the envelope by the .         Tami Chacon RN  [facility]

## 2022-05-06 ENCOUNTER — OFFICE VISIT (OUTPATIENT)
Dept: INTERNAL MEDICINE | Facility: CLINIC | Age: 42
End: 2022-05-06
Payer: COMMERCIAL

## 2022-05-06 ENCOUNTER — TELEPHONE (OUTPATIENT)
Dept: INTERNAL MEDICINE | Facility: CLINIC | Age: 42
End: 2022-05-06

## 2022-05-06 VITALS
SYSTOLIC BLOOD PRESSURE: 111 MMHG | WEIGHT: 170.4 LBS | HEIGHT: 64 IN | HEART RATE: 59 BPM | DIASTOLIC BLOOD PRESSURE: 80 MMHG | BODY MASS INDEX: 29.09 KG/M2

## 2022-05-06 DIAGNOSIS — Z11.59 NEED FOR HEPATITIS C SCREENING TEST: ICD-10-CM

## 2022-05-06 DIAGNOSIS — R07.9 CHEST PAIN, UNSPECIFIED TYPE: ICD-10-CM

## 2022-05-06 DIAGNOSIS — Z23 NEED FOR VACCINATION: ICD-10-CM

## 2022-05-06 DIAGNOSIS — B18.1 VIRAL HEPATITIS B CHRONIC (H): ICD-10-CM

## 2022-05-06 DIAGNOSIS — R74.8 ELEVATED LIPASE: ICD-10-CM

## 2022-05-06 DIAGNOSIS — Z00.00 ANNUAL PHYSICAL EXAM: Primary | ICD-10-CM

## 2022-05-06 DIAGNOSIS — L98.9 SKIN LESION: ICD-10-CM

## 2022-05-06 LAB
D DIMER PPP FEU-MCNC: 0.28 UG/ML FEU (ref 0–0.5)
LIPASE SERPL-CCNC: 63 U/L (ref 0–52)

## 2022-05-06 PROCEDURE — 85379 FIBRIN DEGRADATION QUANT: CPT | Performed by: INTERNAL MEDICINE

## 2022-05-06 PROCEDURE — 99396 PREV VISIT EST AGE 40-64: CPT | Mod: 25 | Performed by: INTERNAL MEDICINE

## 2022-05-06 PROCEDURE — 90732 PPSV23 VACC 2 YRS+ SUBQ/IM: CPT | Performed by: INTERNAL MEDICINE

## 2022-05-06 PROCEDURE — 83690 ASSAY OF LIPASE: CPT | Performed by: INTERNAL MEDICINE

## 2022-05-06 PROCEDURE — 99214 OFFICE O/P EST MOD 30 MIN: CPT | Mod: 25 | Performed by: INTERNAL MEDICINE

## 2022-05-06 PROCEDURE — 36415 COLL VENOUS BLD VENIPUNCTURE: CPT | Performed by: INTERNAL MEDICINE

## 2022-05-06 PROCEDURE — 90471 IMMUNIZATION ADMIN: CPT | Performed by: INTERNAL MEDICINE

## 2022-05-06 PROCEDURE — 86803 HEPATITIS C AB TEST: CPT | Performed by: INTERNAL MEDICINE

## 2022-05-06 NOTE — PATIENT INSTRUCTIONS
For abdominal:  Can repeat lipase (pancreatic enzyme ) and liver functions in 4 weeks, if it is getting higher, would consider imaging.    Chest pain: chest wall pain, possible costochondritis. Can try antiinflammatories: Ibuprofen, aleve. Can try medication for nerve pain: gabapentin.    Recent US and chest XR was normal.    2. Pneumococcal vaccine 23 today. Come back for tetanus shot (nurse only visit).    3. Will check chest XR and clotting factor today: D-dimer

## 2022-05-06 NOTE — PROGRESS NOTES
"SUBJECTIVE:   CC: Som Kohli is an 42 year old male who presents for preventative health visit.     Som is a 42 y.o. male with history of anxiety, increased BMI, hyperlipidemia, chronic hepatitis B infection, he is currently here for a physical.    He continues to complain about dull pain in his chest on the right side.  It is on and off but he has been noticing it more often recently.  She also has right upper quadrant discomfort on and off that radiates into his right back.  Occasionally lower abdominal discomfort.  He denies food triggering symptoms and he denies any symptoms at night.  Pain has been there for years but he is always very anxious about it.  I have not seen him for a few years but in 2020 his D-dimer, stress echo, ESR were normal.  He has had several normal chest x-rays.    He does have chronic hepatitis B and is followed by gastroenterology.  Recent alpha-fetoprotein and hepatic ultrasounds were normal.    He tells me that his appetite has been low for the last month and he lost 7 pounds however when I checked his weight going back to August of last year his weight is quite stable.    He moves his bowels every second day.    He denies any shortness of breath or cough.  He has had blood work done in April which was reviewed.    {  Today's PHQ-2 Score:   PHQ-2 ( 1999 Pf[employer]) 4/25/2022   Q1: Little interest or pleasure in doing things 0   Q2: Feeling down, depressed or hopeless 0   PHQ-2 Score 0         Social History     Tobacco Use     Smoking status: Never Smoker     Smokeless tobacco: Never Used   Substance Use Topics     Alcohol use: No     Reviewed and updated as needed this visit by clinical staff    Allergies  Meds                Reviewed and updated as needed this visit by Provider                     Review of Systems  As above.    OBJECTIVE:   /80 (BP Location: Left arm, Patient Position: Sitting, Cuff Size: Adult Regular)   Pulse 59   Ht 1.62 m (5' 3.78\")   Wt 77.3 kg (170 lb 6.4 " oz)   BMI 29.45 kg/m      Physical Exam  General: well appearing male, alert and oriented x3  EYES: Eyelids, conjunctiva, and sclera were normal. Pupils were normal.   HEAD, EARS, NOSE, MOUTH, AND THROAT: no cervical LAD, no thyromegaly or nodules appreciated. TMs are visualized and normal, oropharynx is clear.  RESPIRATORY: respirations non labored, CTA bl, no wheezes, rales, no forced expiratory wheezing.  He does tenderness to palpation of his anterior ribs and sternum on the right side.  CARDIOVASCULAR: Heart rate and rhythm were normal. No murmurs, rubs,gallops. There was no peripheral edema.   GASTROINTESTINAL: Positive bowel sounds, abdomen is soft, non tender, non distended.     MUSCULOSKELETAL: Muscle mass was normal for age. No joint synovitis or deformity.  LYMPHATIC: There were no enlarged nodes palpable.  SKIN/HAIR/NAILS: Skin color was normal.  No rashes.  NEUROLOGIC: The patient was alert and oriented.  Speech was normal.  There is no facial asymmetry.   PSYCHIATRIC:  Mood and affect were normal.  Moderate anxiety noted        ASSESSMENT/PLAN:   Som was seen today for physical.    Diagnoses and all orders for this visit:    Annual physical exam  He already had blood work done in April which showed mild to moderate hyperlipidemia.  We discussed vaccinations today, he continues to be very anxious about COVID-vaccine.  He was amiable at getting a pneumonia shot today and tetanus shot at a later time.  -     Hepatitis C Screen Reflex to HCV RNA Quant and Genotype  -     PPSV23, IM/SUBQ (2+ YRS) - [id number]    Chronic right upper quadrant discomfort, elevated lipase, chronic hepatitis B  He is followed by gastroenterology.  Recent ultrasound of the liver and alpha-fetoprotein were normal.  Lipase was tiny bit above normal and he is very fixated on that.  We will repeat it again today.  Previously CT scan was recommended by gastroenterology but he currently wants to defer due to radiation exposure.   "Weight noted to be stable although he feels like he lost 8 pounds.  -     Lipase    Chest pain, unspecified type  In the past, stress echo, D-dimer and chest x-ray in 2020 were negative.  We will repeat again today and check a chest x-ray since he reports the pain is more persistent.  On exam I would vouch tomorrow for costochondritis.  Could consider amitriptyline at bedtime to help with his anxiety and chronic unexplained pains.  -     XR Chest 2 Views; Future  -     D dimer, quantitative    Skin lesion  He has some nail discoloration and new darker moles popping on his arms as well, he will see dermatologist  -     Cancel: Adult Dermatology Referral; Future    Other orders  -     REVIEW OF HEALTH MAINTENANCE PROTOCOL ORDERS        Estimated body mass index is 30.16 kg/m  as calculated from the following:    Height as of 4/25/22: 1.613 m (5' 3.5\").    Weight as of 4/25/22: 78.5 kg (173 lb).     He reports that he has never smoked. He has never used smokeless tobacco.      Anita Garduno MD  [facility]  "

## 2022-05-06 NOTE — TELEPHONE ENCOUNTER
Pt would like to have a copy of his x-rays 5-6-2022, as well as the 2 others from last year. Please call him.

## 2022-05-07 LAB — HCV AB SERPL QL IA: NONREACTIVE

## 2022-05-08 ENCOUNTER — TELEPHONE (OUTPATIENT)
Dept: INTERNAL MEDICINE | Facility: CLINIC | Age: 42
End: 2022-05-08
Payer: COMMERCIAL

## 2022-05-09 NOTE — TELEPHONE ENCOUNTER
Please let pt know:  Chest XR is normal.  Clotting factor is not elevated. Since stress test was normal in 2020, heart disease in unlikely.  I think your chest pain is due to inflammation in the cartilage of the ribs: costochondritis. You can try taking Ibuprofen 400 mg once or twice a day as needed for pain.    Hepatitis C screen is negative.    Pancreatic enzyme continues to be tiny bit elevated. Since you have ongoing unexplained pain in the right upper abdomen, I would recommend proceed with CT scan as GI specialist recommended.    Dr EARL

## 2022-05-10 ENCOUNTER — TELEPHONE (OUTPATIENT)
Dept: INTERNAL MEDICINE | Facility: CLINIC | Age: 42
End: 2022-05-10
Payer: COMMERCIAL

## 2022-05-10 NOTE — TELEPHONE ENCOUNTER
Reason for Call:  Request for results:    Name of test or procedure:  Lab    Date of test of procedure: 05/06/2022      Location of the test or procedure: [place]    OK to leave the result message on voice mail or with a family member? YES    Phone number Patient can be reached at:  Cell number on file:    Telephone Information:   Mobile [phone]       Additional comments: ASAP    Call taken on 5/10/2022 at 11:57 AM by Syeda Damon

## 2022-05-10 NOTE — TELEPHONE ENCOUNTER
Anita Garduno MD 2 days ago     [facility]       Please let pt know:  Chest XR is normal.  Clotting factor is not elevated. Since stress test was normal in 2020, heart disease in unlikely.  I think your chest pain is due to inflammation in the cartilage of the ribs: costochondritis. You can try taking Ibuprofen 400 mg once or twice a day as needed for pain.     Hepatitis C screen is negative.     Pancreatic enzyme continues to be tiny bit elevated. Since you have ongoing unexplained pain in the right upper abdomen, I would recommend proceed with CT scan as GI specialist recommended.     Dr EARL

## 2022-05-26 ENCOUNTER — TRANSFERRED RECORDS (OUTPATIENT)
Dept: HEALTH INFORMATION MANAGEMENT | Facility: CLINIC | Age: 42
End: 2022-05-26

## 2022-05-26 ENCOUNTER — ALLIED HEALTH/NURSE VISIT (OUTPATIENT)
Dept: FAMILY MEDICINE | Facility: CLINIC | Age: 42
End: 2022-05-26
Payer: COMMERCIAL

## 2022-05-26 ENCOUNTER — TELEPHONE (OUTPATIENT)
Dept: INTERNAL MEDICINE | Facility: CLINIC | Age: 42
End: 2022-05-26
Payer: COMMERCIAL

## 2022-05-26 DIAGNOSIS — Z00.00 ROUTINE HEALTH MAINTENANCE: Primary | ICD-10-CM

## 2022-05-26 DIAGNOSIS — Z00.00 ROUTINE HEALTH MAINTENANCE: ICD-10-CM

## 2022-05-26 LAB
ALT SERPL-CCNC: 47 IU/L (ref 0–44)
AST SERPL-CCNC: 23 IU/L (ref 0–40)

## 2022-05-26 PROCEDURE — 99207 PR NO CHARGE NURSE ONLY: CPT

## 2022-05-26 PROCEDURE — 90471 IMMUNIZATION ADMIN: CPT

## 2022-05-26 PROCEDURE — 90715 TDAP VACCINE 7 YRS/> IM: CPT

## 2022-06-09 ENCOUNTER — TELEPHONE (OUTPATIENT)
Dept: INTERNAL MEDICINE | Facility: CLINIC | Age: 42
End: 2022-06-09
Payer: COMMERCIAL

## 2022-06-09 DIAGNOSIS — R79.89 ELEVATED LIVER FUNCTION TESTS: ICD-10-CM

## 2022-06-09 DIAGNOSIS — R74.8 ELEVATED LIPASE: Primary | ICD-10-CM

## 2022-06-09 NOTE — TELEPHONE ENCOUNTER
Reason for Call:  Other call back/order    Detailed comments:  Pt is calling in and wanting to do the follow up on the labs per his instructions on this paperwork.    1. 5/6/22 visit  2.   3. For abdominal:  Can repeat lipase (pancreatic enzyme ) and liver functions in 4 weeks, if it is getting higher, would consider imaging.    Please put in order and also call pt to schedule to schedule the lab.      Phone Number Patient can be reached at: Cell number on file:    Telephone Information:   Mobile [phone]       Best Time: any    Can we leave a detailed message on this number? YES    Call taken on 6/9/2022 at 11:49 AM by Pam J. Behr

## 2022-06-10 NOTE — TELEPHONE ENCOUNTER
Liver function and lipase labs pended for provider review.       Patricia Aguero RN  [facility]    9:05 AM, Whit 10, 2022

## 2022-07-26 ENCOUNTER — TELEPHONE (OUTPATIENT)
Dept: INTERNAL MEDICINE | Facility: CLINIC | Age: 42
End: 2022-07-26

## 2022-07-26 ENCOUNTER — LAB (OUTPATIENT)
Dept: LAB | Facility: CLINIC | Age: 42
End: 2022-07-26
Payer: COMMERCIAL

## 2022-07-26 DIAGNOSIS — R74.8 ELEVATED LIPASE: ICD-10-CM

## 2022-07-26 DIAGNOSIS — R79.89 ELEVATED LIVER FUNCTION TESTS: ICD-10-CM

## 2022-07-26 LAB
ALBUMIN SERPL BCG-MCNC: 4.8 G/DL (ref 3.5–5.2)
ALP SERPL-CCNC: 61 U/L (ref 40–129)
ALT SERPL W P-5'-P-CCNC: 38 U/L (ref 10–50)
AST SERPL W P-5'-P-CCNC: 30 U/L (ref 10–50)
BILIRUB DIRECT SERPL-MCNC: <0.2 MG/DL (ref 0–0.3)
BILIRUB SERPL-MCNC: 1.1 MG/DL
LIPASE SERPL-CCNC: 83 U/L (ref 13–60)
PROT SERPL-MCNC: 7.7 G/DL (ref 6.4–8.3)

## 2022-07-26 PROCEDURE — 80076 HEPATIC FUNCTION PANEL: CPT

## 2022-07-26 PROCEDURE — 83690 ASSAY OF LIPASE: CPT

## 2022-07-26 PROCEDURE — 36415 COLL VENOUS BLD VENIPUNCTURE: CPT

## 2022-07-27 NOTE — TELEPHONE ENCOUNTER
Please let pt know recent lab results.  Liver functions are normal.  Pancreatic enzyme is still slightly elevated. If he is having ongoing pain, I would recommend office follow up to discuss imaging of pancreas.  Dr EARL

## 2022-08-01 ENCOUNTER — HOSPITAL ENCOUNTER (OUTPATIENT)
Dept: ULTRASOUND IMAGING | Facility: HOSPITAL | Age: 42
Discharge: HOME | End: 2022-08-01
Attending: OTOLARYNGOLOGY | Admitting: OTOLARYNGOLOGY
Payer: COMMERCIAL

## 2022-08-01 DIAGNOSIS — M54.2 CERVICALGIA: ICD-10-CM

## 2022-08-01 PROCEDURE — 76536 US EXAM OF HEAD AND NECK: CPT

## 2022-08-02 ENCOUNTER — NURSE TRIAGE (OUTPATIENT)
Dept: NURSING | Facility: CLINIC | Age: 42
End: 2022-08-02

## 2022-08-03 NOTE — TELEPHONE ENCOUNTER
Triage Call:    Caller: Patient    He was eating some burgers and saw some mold on the second bun and is wondering if there is a concern if there was mold on the first bun.   He is wondering if he should throw it up, or get a vaccine or medication for anything possible.      Home care recommended disposition.  Patient verbalized understanding about recommendations and disposition.  Encouraged to call back with any further questions.      Florida Pope RN on 8/2/2022 at 10:46 PM      Reason for Disposition    Nursing judgment or information in reference    Additional Information    Negative: Nursing judgment, per information in Reference    Negative: Information only call about a Well Adult (no illness or injury)    Negative: Nursing judgment or information in reference    Negative: Nursing judgment or information in reference    Negative: Nursing judgment or information in reference    Negative: Nursing judgment or information in reference    Negative: Nursing judgment or information in reference    Negative: Nursing judgment or information in reference    Negative: Nursing judgment or information in reference    Negative: Nursing judgment or information in reference    Negative: Nursing judgment or information in reference    Negative: Nursing judgment or information in reference    Negative: Nursing judgment or information in reference    Negative: Nursing judgment or information in reference    Negative: Nursing judgment or information in reference    Protocols used: NO GUIDELINE [id number]

## 2023-04-20 ENCOUNTER — PATIENT OUTREACH (OUTPATIENT)
Dept: CARE COORDINATION | Facility: CLINIC | Age: 43
End: 2023-04-20
Payer: COMMERCIAL

## 2023-05-04 ENCOUNTER — PATIENT OUTREACH (OUTPATIENT)
Dept: CARE COORDINATION | Facility: CLINIC | Age: 43
End: 2023-05-04
Payer: COMMERCIAL

## 2025-04-01 ENCOUNTER — HOSPITAL ENCOUNTER (OUTPATIENT)
Dept: ULTRASOUND IMAGING | Facility: CLINIC | Age: 45
Discharge: HOME | End: 2025-04-01
Attending: OTOLARYNGOLOGY | Admitting: OTOLARYNGOLOGY
Payer: COMMERCIAL

## 2025-04-01 DIAGNOSIS — D49.2 NEOPLASM OF UNSPECIFIED BEHAVIOR OF BONE, SOFT TISSUE, AND SKIN: ICD-10-CM

## 2025-04-01 PROCEDURE — 76536 US EXAM OF HEAD AND NECK: CPT
